# Patient Record
Sex: MALE | Race: WHITE | NOT HISPANIC OR LATINO | Employment: FULL TIME | URBAN - NONMETROPOLITAN AREA
[De-identification: names, ages, dates, MRNs, and addresses within clinical notes are randomized per-mention and may not be internally consistent; named-entity substitution may affect disease eponyms.]

---

## 2023-03-07 LAB
ALANINE AMINOTRANSFERASE (SGPT) (U/L) IN SER/PLAS: 20 U/L (ref 10–52)
ALBUMIN (G/DL) IN SER/PLAS: 4.2 G/DL (ref 3.4–5)
ALBUMIN (MG/L) IN URINE: <7 MG/L
ALBUMIN/CREATININE (UG/MG) IN URINE: NORMAL UG/MG CRT (ref 0–30)
ALKALINE PHOSPHATASE (U/L) IN SER/PLAS: 105 U/L (ref 33–120)
ANION GAP IN SER/PLAS: 11 MMOL/L (ref 10–20)
ASPARTATE AMINOTRANSFERASE (SGOT) (U/L) IN SER/PLAS: 14 U/L (ref 9–39)
BASOPHILS (10*3/UL) IN BLOOD BY AUTOMATED COUNT: 0.05 X10E9/L (ref 0–0.1)
BASOPHILS/100 LEUKOCYTES IN BLOOD BY AUTOMATED COUNT: 0.5 % (ref 0–2)
BILIRUBIN TOTAL (MG/DL) IN SER/PLAS: 0.6 MG/DL (ref 0–1.2)
CALCIUM (MG/DL) IN SER/PLAS: 9.3 MG/DL (ref 8.6–10.6)
CARBON DIOXIDE, TOTAL (MMOL/L) IN SER/PLAS: 29 MMOL/L (ref 21–32)
CHLORIDE (MMOL/L) IN SER/PLAS: 104 MMOL/L (ref 98–107)
CHOLESTEROL (MG/DL) IN SER/PLAS: 116 MG/DL (ref 0–199)
CHOLESTEROL IN HDL (MG/DL) IN SER/PLAS: 28.8 MG/DL
CHOLESTEROL/HDL RATIO: 4
COBALAMIN (VITAMIN B12) (PG/ML) IN SER/PLAS: 340 PG/ML (ref 211–911)
CREATININE (MG/DL) IN SER/PLAS: 0.75 MG/DL (ref 0.5–1.3)
CREATININE (MG/DL) IN URINE: 66 MG/DL (ref 20–370)
EOSINOPHILS (10*3/UL) IN BLOOD BY AUTOMATED COUNT: 0.17 X10E9/L (ref 0–0.7)
EOSINOPHILS/100 LEUKOCYTES IN BLOOD BY AUTOMATED COUNT: 1.7 % (ref 0–6)
ERYTHROCYTE DISTRIBUTION WIDTH (RATIO) BY AUTOMATED COUNT: 13 % (ref 11.5–14.5)
ERYTHROCYTE MEAN CORPUSCULAR HEMOGLOBIN CONCENTRATION (G/DL) BY AUTOMATED: 31.6 G/DL (ref 32–36)
ERYTHROCYTE MEAN CORPUSCULAR VOLUME (FL) BY AUTOMATED COUNT: 96 FL (ref 80–100)
ERYTHROCYTES (10*6/UL) IN BLOOD BY AUTOMATED COUNT: 5.64 X10E12/L (ref 4.5–5.9)
ESTIMATED AVERAGE GLUCOSE FOR HBA1C: 154 MG/DL
GFR MALE: >90 ML/MIN/1.73M2
GLUCOSE (MG/DL) IN SER/PLAS: 149 MG/DL (ref 74–99)
HEMATOCRIT (%) IN BLOOD BY AUTOMATED COUNT: 54.4 % (ref 41–52)
HEMOGLOBIN (G/DL) IN BLOOD: 17.2 G/DL (ref 13.5–17.5)
HEMOGLOBIN A1C/HEMOGLOBIN TOTAL IN BLOOD: 7 %
IMMATURE GRANULOCYTES/100 LEUKOCYTES IN BLOOD BY AUTOMATED COUNT: 0.7 % (ref 0–0.9)
LDL: 42 MG/DL (ref 0–99)
LEUKOCYTES (10*3/UL) IN BLOOD BY AUTOMATED COUNT: 9.7 X10E9/L (ref 4.4–11.3)
LYMPHOCYTES (10*3/UL) IN BLOOD BY AUTOMATED COUNT: 2.29 X10E9/L (ref 1.2–4.8)
LYMPHOCYTES/100 LEUKOCYTES IN BLOOD BY AUTOMATED COUNT: 23.5 % (ref 13–44)
MONOCYTES (10*3/UL) IN BLOOD BY AUTOMATED COUNT: 0.91 X10E9/L (ref 0.1–1)
MONOCYTES/100 LEUKOCYTES IN BLOOD BY AUTOMATED COUNT: 9.4 % (ref 2–10)
NEUTROPHILS (10*3/UL) IN BLOOD BY AUTOMATED COUNT: 6.24 X10E9/L (ref 1.2–7.7)
NEUTROPHILS/100 LEUKOCYTES IN BLOOD BY AUTOMATED COUNT: 64.2 % (ref 40–80)
NON HDL CHOLESTEROL: 87 MG/DL
NRBC (PER 100 WBCS) BY AUTOMATED COUNT: 0 /100 WBC (ref 0–0)
PLATELETS (10*3/UL) IN BLOOD AUTOMATED COUNT: 180 X10E9/L (ref 150–450)
POTASSIUM (MMOL/L) IN SER/PLAS: 4.4 MMOL/L (ref 3.5–5.3)
PROSTATE SPECIFIC ANTIGEN,SCREEN: 1.85 NG/ML (ref 0–4)
PROTEIN TOTAL: 6.9 G/DL (ref 6.4–8.2)
SODIUM (MMOL/L) IN SER/PLAS: 140 MMOL/L (ref 136–145)
THYROTROPIN (MIU/L) IN SER/PLAS BY DETECTION LIMIT <= 0.05 MIU/L: 1.26 MIU/L (ref 0.44–3.98)
TRIGLYCERIDE (MG/DL) IN SER/PLAS: 224 MG/DL (ref 0–149)
UREA NITROGEN (MG/DL) IN SER/PLAS: 12 MG/DL (ref 6–23)
VLDL: 45 MG/DL (ref 0–40)

## 2023-03-08 DIAGNOSIS — E11.9 TYPE 2 DIABETES MELLITUS WITHOUT COMPLICATION, WITHOUT LONG-TERM CURRENT USE OF INSULIN (MULTI): Primary | ICD-10-CM

## 2023-03-08 DIAGNOSIS — E53.8 VITAMIN B 12 DEFICIENCY: ICD-10-CM

## 2023-07-26 ENCOUNTER — TELEPHONE (OUTPATIENT)
Dept: PRIMARY CARE | Facility: CLINIC | Age: 58
End: 2023-07-26
Payer: COMMERCIAL

## 2023-07-26 NOTE — TELEPHONE ENCOUNTER
Would like to examine him, please ask him to come tomorrow at 330, he needs hormones drawn, and I will decide whether a mammogram or ultrasound are appropriate.

## 2023-07-26 NOTE — TELEPHONE ENCOUNTER
The patient was seen at [facility] today. He was advised to have a mammogram. He is calling to request the order.  I have sent the email to request the records.  Once obtained, will place on the providers desk for review.

## 2023-07-27 ENCOUNTER — LAB (OUTPATIENT)
Dept: LAB | Facility: LAB | Age: 58
End: 2023-07-27
Payer: COMMERCIAL

## 2023-07-27 ENCOUNTER — OFFICE VISIT (OUTPATIENT)
Dept: PRIMARY CARE | Facility: CLINIC | Age: 58
End: 2023-07-27
Payer: COMMERCIAL

## 2023-07-27 VITALS
RESPIRATION RATE: 16 BRPM | BODY MASS INDEX: 30.17 KG/M2 | DIASTOLIC BLOOD PRESSURE: 68 MMHG | WEIGHT: 210.3 LBS | TEMPERATURE: 98.1 F | SYSTOLIC BLOOD PRESSURE: 120 MMHG | OXYGEN SATURATION: 94 % | HEART RATE: 73 BPM

## 2023-07-27 DIAGNOSIS — N64.59 ABNORMAL BREAST FINDING: Primary | ICD-10-CM

## 2023-07-27 DIAGNOSIS — N62 GYNECOMASTIA, MALE: ICD-10-CM

## 2023-07-27 DIAGNOSIS — N64.59 ABNORMAL BREAST FINDING: ICD-10-CM

## 2023-07-27 PROBLEM — N63.20 BREAST MASS, LEFT: Status: ACTIVE | Noted: 2023-07-27

## 2023-07-27 PROBLEM — R07.89 CHEST WALL PAIN: Status: ACTIVE | Noted: 2023-07-27

## 2023-07-27 PROBLEM — D58.2 ELEVATED HEMOGLOBIN (CMS-HCC): Status: ACTIVE | Noted: 2023-07-27

## 2023-07-27 PROBLEM — H93.11 TINNITUS, RIGHT: Status: ACTIVE | Noted: 2023-07-27

## 2023-07-27 PROBLEM — I82.409 LEG DVT (DEEP VENOUS THROMBOEMBOLISM), ACUTE (MULTI): Status: ACTIVE | Noted: 2023-07-27

## 2023-07-27 PROBLEM — M25.552 LEFT HIP PAIN: Status: ACTIVE | Noted: 2023-07-27

## 2023-07-27 PROBLEM — B00.1 COLD SORE: Status: ACTIVE | Noted: 2023-07-27

## 2023-07-27 PROBLEM — M51.36 DEGENERATIVE DISC DISEASE, LUMBAR: Status: ACTIVE | Noted: 2023-07-27

## 2023-07-27 PROBLEM — M79.606 LEG PAIN: Status: ACTIVE | Noted: 2023-07-27

## 2023-07-27 PROBLEM — M17.11 RIGHT KNEE DJD: Status: ACTIVE | Noted: 2023-07-27

## 2023-07-27 PROBLEM — I49.9 ARRHYTHMIA: Status: ACTIVE | Noted: 2023-07-27

## 2023-07-27 PROBLEM — K21.9 GERD (GASTROESOPHAGEAL REFLUX DISEASE): Status: ACTIVE | Noted: 2023-07-27

## 2023-07-27 PROBLEM — I47.10 SVT (SUPRAVENTRICULAR TACHYCARDIA) (CMS-HCC): Status: ACTIVE | Noted: 2023-07-27

## 2023-07-27 PROBLEM — F17.200 TOBACCO DEPENDENCE: Status: ACTIVE | Noted: 2023-07-27

## 2023-07-27 PROBLEM — L03.119 CELLULITIS OF LOWER LEG: Status: ACTIVE | Noted: 2023-07-27

## 2023-07-27 PROBLEM — M76.892 TENDINITIS OF BOTH QUADRICEP TENDONS: Status: ACTIVE | Noted: 2023-07-27

## 2023-07-27 PROBLEM — M51.369 DEGENERATIVE DISC DISEASE, LUMBAR: Status: ACTIVE | Noted: 2023-07-27

## 2023-07-27 PROBLEM — H90.3 SENSORINEURAL HEARING LOSS OF BOTH EARS: Status: ACTIVE | Noted: 2023-07-27

## 2023-07-27 PROBLEM — S43.439A LABRAL TEAR OF SHOULDER: Status: ACTIVE | Noted: 2023-07-27

## 2023-07-27 PROBLEM — M19.019 AC (ACROMIOCLAVICULAR) JOINT ARTHRITIS: Status: ACTIVE | Noted: 2023-07-27

## 2023-07-27 PROBLEM — M76.892 TENDINITIS OF LEFT HIP: Status: ACTIVE | Noted: 2023-07-27

## 2023-07-27 PROBLEM — M25.522 LEFT ELBOW PAIN: Status: ACTIVE | Noted: 2023-07-27

## 2023-07-27 PROBLEM — I82.409 DVT, LOWER EXTREMITY, RECURRENT (MULTI): Status: ACTIVE | Noted: 2023-07-27

## 2023-07-27 PROBLEM — M25.561 RIGHT KNEE PAIN: Status: ACTIVE | Noted: 2023-07-27

## 2023-07-27 PROBLEM — N52.9 MALE ERECTILE DISORDER: Status: ACTIVE | Noted: 2023-07-27

## 2023-07-27 PROBLEM — R21 RASH: Status: ACTIVE | Noted: 2023-07-27

## 2023-07-27 PROBLEM — H61.21 IMPACTED CERUMEN OF RIGHT EAR: Status: ACTIVE | Noted: 2023-07-27

## 2023-07-27 PROBLEM — S73.192D ACETABULAR LABRUM TEAR, LEFT, SUBSEQUENT ENCOUNTER: Status: ACTIVE | Noted: 2023-07-27

## 2023-07-27 PROBLEM — Z86.718 HISTORY OF DVT (DEEP VEIN THROMBOSIS): Status: ACTIVE | Noted: 2023-07-27

## 2023-07-27 PROBLEM — K44.9 HIATAL HERNIA: Status: ACTIVE | Noted: 2023-07-27

## 2023-07-27 PROBLEM — G89.4 CHRONIC PAIN DISORDER: Status: ACTIVE | Noted: 2023-07-27

## 2023-07-27 PROBLEM — M25.519 SHOULDER PAIN: Status: ACTIVE | Noted: 2023-07-27

## 2023-07-27 PROBLEM — M79.642 PAIN OF LEFT HAND: Status: ACTIVE | Noted: 2023-07-27

## 2023-07-27 PROBLEM — M76.891 TENDINITIS OF BOTH QUADRICEP TENDONS: Status: ACTIVE | Noted: 2023-07-27

## 2023-07-27 PROBLEM — R79.89 LOW TESTOSTERONE: Status: ACTIVE | Noted: 2023-07-27

## 2023-07-27 PROBLEM — R07.81 RIB PAIN ON LEFT SIDE: Status: ACTIVE | Noted: 2023-07-27

## 2023-07-27 PROBLEM — E78.5 HYPERLIPIDEMIA: Status: ACTIVE | Noted: 2023-07-27

## 2023-07-27 PROBLEM — E11.9 TYPE 2 DIABETES MELLITUS (MULTI): Status: ACTIVE | Noted: 2023-07-27

## 2023-07-27 PROBLEM — F44.6 HEARING LOSS, FUNCTIONAL: Status: ACTIVE | Noted: 2023-07-27

## 2023-07-27 PROCEDURE — 99213 OFFICE O/P EST LOW 20 MIN: CPT | Performed by: FAMILY MEDICINE

## 2023-07-27 PROCEDURE — 3078F DIAST BP <80 MM HG: CPT | Performed by: FAMILY MEDICINE

## 2023-07-27 PROCEDURE — 3051F HG A1C>EQUAL 7.0%<8.0%: CPT | Performed by: FAMILY MEDICINE

## 2023-07-27 PROCEDURE — 3074F SYST BP LT 130 MM HG: CPT | Performed by: FAMILY MEDICINE

## 2023-07-27 RX ORDER — SILDENAFIL 100 MG/1
100 TABLET, FILM COATED ORAL AS NEEDED
COMMUNITY
Start: 2023-07-10 | End: 2023-08-15

## 2023-07-27 RX ORDER — MORPHINE SULFATE 30 MG/1
30 TABLET, FILM COATED, EXTENDED RELEASE ORAL 2 TIMES DAILY
COMMUNITY
End: 2024-03-13 | Stop reason: ALTCHOICE

## 2023-07-27 RX ORDER — MAGNESIUM 200 MG
TABLET ORAL
COMMUNITY
Start: 2022-02-14

## 2023-07-27 RX ORDER — RIVAROXABAN 20 MG/1
1 TABLET, FILM COATED ORAL DAILY
COMMUNITY
Start: 2022-08-17 | End: 2023-10-30

## 2023-07-27 RX ORDER — METFORMIN HYDROCHLORIDE 500 MG/1
TABLET, EXTENDED RELEASE ORAL
COMMUNITY
End: 2024-02-15

## 2023-07-27 RX ORDER — METOPROLOL TARTRATE 50 MG/1
1 TABLET ORAL EVERY 12 HOURS
COMMUNITY
Start: 2015-08-25 | End: 2024-02-15

## 2023-07-27 RX ORDER — BLOOD-GLUCOSE METER
EACH MISCELLANEOUS
COMMUNITY
Start: 2016-05-12 | End: 2024-01-21 | Stop reason: WASHOUT

## 2023-07-27 RX ORDER — GLIMEPIRIDE 4 MG/1
1 TABLET ORAL DAILY
COMMUNITY
Start: 2018-05-21 | End: 2024-02-15

## 2023-07-27 RX ORDER — ATORVASTATIN CALCIUM 40 MG/1
1 TABLET, FILM COATED ORAL DAILY
COMMUNITY
End: 2024-02-15

## 2023-07-27 RX ORDER — DAPAGLIFLOZIN 10 MG/1
1 TABLET, FILM COATED ORAL DAILY
COMMUNITY
Start: 2021-03-03 | End: 2024-01-29

## 2023-07-27 RX ORDER — TRAZODONE HYDROCHLORIDE 50 MG/1
50 TABLET ORAL NIGHTLY
COMMUNITY

## 2023-07-27 ASSESSMENT — PATIENT HEALTH QUESTIONNAIRE - PHQ9
SUM OF ALL RESPONSES TO PHQ9 QUESTIONS 1 AND 2: 0
2. FEELING DOWN, DEPRESSED OR HOPELESS: NOT AT ALL
1. LITTLE INTEREST OR PLEASURE IN DOING THINGS: NOT AT ALL

## 2023-07-27 ASSESSMENT — PAIN SCALES - GENERAL: PAINLEVEL: 0-NO PAIN

## 2023-07-28 LAB
DEHYDROEPIANDROSTERONE SULFATE (DHEA-S) (UG/DL) IN SER/: 140 UG/DL (ref 70–310)
ESTRADIOL (PG/ML) IN SER/PLAS: 37 PG/ML
FOLLITROPIN (IU/L) IN SER/PLAS: 6 IU/L
GROWTH HORMONE: NORMAL
HCG TUMOR MARKER: <3 IU/L
LUTEINIZING HORMONE (IU/ML) IN SER/PLAS: 4.1 IU/L
PROGESTERONE (NG/ML) IN SER/PLAS: <0.3 NG/ML
PROLACTIN (UG/L) IN SER/PLAS: 5.3 UG/L (ref 2–18)
TESTOSTERONE (NG/DL) IN SER/PLAS: 270 NG/DL (ref 240–1000)
THYROTROPIN (MIU/L) IN SER/PLAS BY DETECTION LIMIT <= 0.05 MIU/L: 1.24 MIU/L (ref 0.44–3.98)

## 2023-08-01 ENCOUNTER — APPOINTMENT (OUTPATIENT)
Dept: PRIMARY CARE | Facility: CLINIC | Age: 58
End: 2023-08-01
Payer: COMMERCIAL

## 2023-08-01 LAB — ANDROSTENEDIONE (NG/DL) IN SER/PLAS: 0.84 NG/ML (ref 0.23–0.89)

## 2023-08-03 ENCOUNTER — TELEPHONE (OUTPATIENT)
Dept: PRIMARY CARE | Facility: CLINIC | Age: 58
End: 2023-08-03
Payer: COMMERCIAL

## 2023-08-03 NOTE — PROGRESS NOTES
He did not do the ultrasound yet, he said he guarantees these are not his results his US is on 8/15

## 2023-08-03 NOTE — TELEPHONE ENCOUNTER
Addendum, I figured out the breast ultrasound confusion, will call him after his results RN in another couple of weeks.

## 2023-08-03 NOTE — TELEPHONE ENCOUNTER
----- Message from Sharda Sparks CMA sent at 8/3/2023  8:10 AM EDT -----        ----- Message -----  From: Bradley Mccormick MD  Sent: 8/3/2023   6:38 AM EDT  To: [place] [id number] Clinical Support Staff    Not sure if Nathaniel was called after his labs and ultrasound were completed, all hormones and labs were normal.  Ultrasound just showed benign-appearing breast tissue behind both nipples.  He has follow-up with Dr. Mix in about 3 weeks, can review further at that time.

## 2023-08-03 NOTE — TELEPHONE ENCOUNTER
----- Message from Sharda Sparks CMA sent at 8/3/2023  8:10 AM EDT -----        ----- Message -----  From: Bradley Mccormick MD  Sent: 8/3/2023   6:38 AM EDT  To: [place] [id number] Clinical Support Staff    Not sure if Nathaniel was called after his labs and ultrasound were completed, all hormones and labs were normal.  Ultrasound just showed benign-appearing breast tissue behind both nipples.  He has follow-up with Dr. Mix in about 3 weeks, can review further at that time.

## 2023-08-03 NOTE — Clinical Note
Not sure if Nathaniel was called after his labs and ultrasound were completed, all hormones and labs were normal.  Ultrasound just showed benign-appearing breast tissue behind both nipples.  He has follow-up with Dr. Mix in about 3 weeks, can review further at that time.

## 2023-08-03 NOTE — PROGRESS NOTES
Subjective   Patient ID: Madhu Shah is a 58 y.o. male who presents for Follow-up (Found lump in left breast and wanted him to be seen by Primary ).    HPI   Nathaniel was seen today, for a left breast lump, was seen in the urgent care a day or 2 ago, was told to get a mammogram.  I recommended follow-up here, for an exam, as well as laboratory studies.  Denies significant pain, skin changes, no nipple discharge.  Right side is fine.  Energy is good, he is taking medications as prescribed, has no other concerns.  Review of Systems  The full, 10+ multi-organ review of systems, is within normal limits with the exception of what is noted above in HPI.  Objective   /68 (BP Location: Right arm, Patient Position: Sitting, BP Cuff Size: Adult)   Pulse 73   Temp 36.7 °C (98.1 °F) (Temporal)   Resp 16   Wt 95.4 kg (210 lb 4.8 oz)   SpO2 94%   BMI 30.17 kg/m²     Physical Exam  Cardiac exam reveals a regular rate rhythm, lungs are clear, no lower extremity edema present.  Left breast exam reveals, directly behind the areola, extending slightly laterally, and approximately 3 cm spongy mobile minimally tender soft tissue lump.  No skin changes.  No axillary adenopathy or supraclavicular adenopathy noted  Assessment/Plan     Gynecomastia, left-sided, we will order extensive hormone and routine labs as noted, as well as a limited left breast ultrasound.  He takes no medications that would typically cause gynecomastia, i.e. such as spironolactone, SSRIs, etc.  We will call when the results are available, come up with a further plan thereafter.    **Portions of this medical record have been created using voice recognition software and may have minor errors which are inherent in voice recognition systems. It has not been fully edited for typographical or grammatical errors**      Total time spent with patient was 25 minutes, over half of it spent counseling and/or coordinating care  **Portions of this medical record have  been created using voice recognition software and may have minor errors which are inherent in voice recognition systems. It has not been fully edited for typographical or grammatical errors**

## 2023-08-14 DIAGNOSIS — N52.2 DRUG-INDUCED ERECTILE DYSFUNCTION: Primary | ICD-10-CM

## 2023-08-15 RX ORDER — SILDENAFIL 100 MG/1
100 TABLET, FILM COATED ORAL AS NEEDED
Qty: 12 TABLET | Refills: 11 | Status: SHIPPED | OUTPATIENT
Start: 2023-08-15

## 2023-08-18 NOTE — PROGRESS NOTES
Subjective   Patient ID: Madhu Shah is a 58 y.o. male who presents for 6 mo fuv (No complaints).      HPI  The patient is here for follow-up.  He has type 2 diabetes.  Takes metformin and glimepiride and Farxiga.  Denies side effects.  Trying to follow healthy diabetic diet.    Has elevated cholesterol.  Takes atorvastatin to help lower cholesterol.  Denies side effects no chest pain chest pressure chest tightness with activity.  Tries to be as active as possible.    Still smoking.  At this time not interested in medication to help reduce use of tobacco use.    Continues on metoprolol and Xarelto with no side effects.  Continues on trazodone.      Review of Systems   All other systems reviewed and are negative.        Patient Care Team:  Cole Mix MD as PCP - General  Karen Quintanilla MD as PCP - [facility] PCP       Objective   /66 (BP Location: Right arm, Patient Position: Sitting, BP Cuff Size: Adult)   Pulse 67   Temp 36.6 °C (97.8 °F) (Temporal)   Wt 96.6 kg (213 lb)   SpO2 94%   BMI 30.56 kg/m²     Physical Exam  Vitals reviewed.   Constitutional:       Appearance: Normal appearance. He is normal weight.   HENT:      Head: Normocephalic and atraumatic.      Right Ear: Tympanic membrane, ear canal and external ear normal.      Left Ear: Tympanic membrane, ear canal and external ear normal.      Nose: Nose normal.      Mouth/Throat:      Mouth: Mucous membranes are moist.      Pharynx: Oropharynx is clear.   Eyes:      Extraocular Movements: Extraocular movements intact.      Conjunctiva/sclera: Conjunctivae normal.      Pupils: Pupils are equal, round, and reactive to light.   Neck:      Vascular: No carotid bruit.   Cardiovascular:      Rate and Rhythm: Normal rate and regular rhythm.      Pulses: Normal pulses.      Heart sounds: Normal heart sounds. No murmur heard.  Pulmonary:      Effort: Pulmonary effort is normal. No respiratory distress.      Breath sounds: Normal breath sounds. No  wheezing, rhonchi or rales.   Abdominal:      General: Abdomen is flat. Bowel sounds are normal.      Palpations: Abdomen is soft. There is no mass.      Tenderness: There is no abdominal tenderness. There is no guarding.   Musculoskeletal:         General: No swelling or deformity. Normal range of motion.      Cervical back: Normal range of motion and neck supple.      Right lower leg: No edema.      Left lower leg: No edema.   Lymphadenopathy:      Cervical: No cervical adenopathy.   Skin:     General: Skin is warm and dry.      Capillary Refill: Capillary refill takes less than 2 seconds.   Neurological:      General: No focal deficit present.      Mental Status: He is alert and oriented to person, place, and time.   Psychiatric:         Mood and Affect: Mood normal.         Behavior: Behavior normal.         Thought Content: Thought content normal.         Judgment: Judgment normal.         Labs reviewed from :              CMP, CBC, Lipid, HgA1C 7.5 % WNL.       Assessment/Plan   Problem List Items Addressed This Visit       Arrhythmia    Elevated hemoglobin (CMS/HCC)    GERD (gastroesophageal reflux disease)    History of DVT (deep vein thrombosis)    Hyperlipidemia    SVT (supraventricular tachycardia) (CMS/HCC)    Type 2 diabetes mellitus (CMS/HCC) - Primary    Relevant Orders    Hemoglobin A1C    Albumin , Urine Random    Tobacco dependence    Relevant Orders    CT lung screening low dose     Other Visit Diagnoses       Prostate cancer screening        Relevant Orders    Prostate Specific Antigen, Screen    Wellness examination        Relevant Orders    Comprehensive Metabolic Panel    Lipid Panel    CBC    Follow Up In [facility] - PCP - Health Maintenance    Need for shingles vaccine        Relevant Orders    Zoster vaccine, recombinant, adult (SHINGRIX) (Completed)          #1 type 2 diabetes    Recent labs indicate mild elevation in hemoglobin A1c.    Goal for hemoglobin A1c less than 7.0 to  reduce complications that can occur from diabetes.    Please stay on current doses of metformin glimepiride and Farxiga.  In addition eat a heart healthy diabetic diet.  A good goal is 5-7 servings of fresh fruit and fresh vegetable every day in addition to lean proteins.    Please see your eye doctor at least once a year being checked for diabetic retinopathy.    With your next labs in 6 months we will repeat the hemoglobin A1c we will also do the urine testing at that time looking for protein in the urine    2.  Continue on Xarelto to prevent blood clots    3.  Continue on atorvastatin to help lower cholesterol to reduce risk for heart attack and stroke.  If you have chest pain chest pressure chest tightness with activity please call    4.  Hypertension.  Blood pressures in good control    5.  Continue on trazodone to help with sleep    6.  Recent mammogram is normal indicating no abnormal breast tissue    7.  White I have ordered a CT scan for lung cancer screening if you choose to stop smoking and you would like help with medication such as Chantix please call    I will see you back in 6 months    You will receive the shingles vaccine today      Follow up in: 6 months or sooner if needed  labs prior.    Scribe Attestation  By signing my name below, ITete Scribe   attest that this documentation has been prepared under the direction and in the presence of Cole Mix MD.

## 2023-08-19 ENCOUNTER — LAB (OUTPATIENT)
Dept: LAB | Facility: LAB | Age: 58
End: 2023-08-19
Payer: COMMERCIAL

## 2023-08-19 DIAGNOSIS — E11.9 TYPE 2 DIABETES MELLITUS WITHOUT COMPLICATION, WITHOUT LONG-TERM CURRENT USE OF INSULIN (MULTI): ICD-10-CM

## 2023-08-19 DIAGNOSIS — E53.8 VITAMIN B 12 DEFICIENCY: ICD-10-CM

## 2023-08-19 LAB
ALANINE AMINOTRANSFERASE (SGPT) (U/L) IN SER/PLAS: 18 U/L (ref 10–52)
ALBUMIN (G/DL) IN SER/PLAS: 4.1 G/DL (ref 3.4–5)
ALKALINE PHOSPHATASE (U/L) IN SER/PLAS: 100 U/L (ref 33–120)
ANION GAP IN SER/PLAS: 13 MMOL/L (ref 10–20)
ASPARTATE AMINOTRANSFERASE (SGOT) (U/L) IN SER/PLAS: 14 U/L (ref 9–39)
BASOPHILS (10*3/UL) IN BLOOD BY AUTOMATED COUNT: 0.05 X10E9/L (ref 0–0.1)
BASOPHILS/100 LEUKOCYTES IN BLOOD BY AUTOMATED COUNT: 0.5 % (ref 0–2)
BILIRUBIN TOTAL (MG/DL) IN SER/PLAS: 0.6 MG/DL (ref 0–1.2)
CALCIUM (MG/DL) IN SER/PLAS: 9.3 MG/DL (ref 8.6–10.6)
CARBON DIOXIDE, TOTAL (MMOL/L) IN SER/PLAS: 27 MMOL/L (ref 21–32)
CHLORIDE (MMOL/L) IN SER/PLAS: 106 MMOL/L (ref 98–107)
CHOLESTEROL (MG/DL) IN SER/PLAS: 118 MG/DL (ref 0–199)
CHOLESTEROL IN HDL (MG/DL) IN SER/PLAS: 28.4 MG/DL
CHOLESTEROL/HDL RATIO: 4.2
COBALAMIN (VITAMIN B12) (PG/ML) IN SER/PLAS: 331 PG/ML (ref 211–911)
CREATININE (MG/DL) IN SER/PLAS: 0.95 MG/DL (ref 0.5–1.3)
EOSINOPHILS (10*3/UL) IN BLOOD BY AUTOMATED COUNT: 0.21 X10E9/L (ref 0–0.7)
EOSINOPHILS/100 LEUKOCYTES IN BLOOD BY AUTOMATED COUNT: 2.3 % (ref 0–6)
ERYTHROCYTE DISTRIBUTION WIDTH (RATIO) BY AUTOMATED COUNT: 12.9 % (ref 11.5–14.5)
ERYTHROCYTE MEAN CORPUSCULAR HEMOGLOBIN CONCENTRATION (G/DL) BY AUTOMATED: 32.5 G/DL (ref 32–36)
ERYTHROCYTE MEAN CORPUSCULAR VOLUME (FL) BY AUTOMATED COUNT: 95 FL (ref 80–100)
ERYTHROCYTES (10*6/UL) IN BLOOD BY AUTOMATED COUNT: 5.89 X10E12/L (ref 4.5–5.9)
ESTIMATED AVERAGE GLUCOSE FOR HBA1C: 169 MG/DL
GFR MALE: >90 ML/MIN/1.73M2
GLUCOSE (MG/DL) IN SER/PLAS: 134 MG/DL (ref 74–99)
HEMATOCRIT (%) IN BLOOD BY AUTOMATED COUNT: 56 % (ref 41–52)
HEMOGLOBIN (G/DL) IN BLOOD: 18.2 G/DL (ref 13.5–17.5)
HEMOGLOBIN A1C/HEMOGLOBIN TOTAL IN BLOOD: 7.5 %
IMMATURE GRANULOCYTES/100 LEUKOCYTES IN BLOOD BY AUTOMATED COUNT: 0.5 % (ref 0–0.9)
LDL: 46 MG/DL (ref 0–99)
LEUKOCYTES (10*3/UL) IN BLOOD BY AUTOMATED COUNT: 9.2 X10E9/L (ref 4.4–11.3)
LYMPHOCYTES (10*3/UL) IN BLOOD BY AUTOMATED COUNT: 2.86 X10E9/L (ref 1.2–4.8)
LYMPHOCYTES/100 LEUKOCYTES IN BLOOD BY AUTOMATED COUNT: 31.3 % (ref 13–44)
MONOCYTES (10*3/UL) IN BLOOD BY AUTOMATED COUNT: 0.78 X10E9/L (ref 0.1–1)
MONOCYTES/100 LEUKOCYTES IN BLOOD BY AUTOMATED COUNT: 8.5 % (ref 2–10)
NEUTROPHILS (10*3/UL) IN BLOOD BY AUTOMATED COUNT: 5.2 X10E9/L (ref 1.2–7.7)
NEUTROPHILS/100 LEUKOCYTES IN BLOOD BY AUTOMATED COUNT: 56.9 % (ref 40–80)
NON HDL CHOLESTEROL: 90 MG/DL
NRBC (PER 100 WBCS) BY AUTOMATED COUNT: 0 /100 WBC (ref 0–0)
PLATELETS (10*3/UL) IN BLOOD AUTOMATED COUNT: 169 X10E9/L (ref 150–450)
POTASSIUM (MMOL/L) IN SER/PLAS: 4.5 MMOL/L (ref 3.5–5.3)
PROTEIN TOTAL: 6.9 G/DL (ref 6.4–8.2)
SODIUM (MMOL/L) IN SER/PLAS: 141 MMOL/L (ref 136–145)
TRIGLYCERIDE (MG/DL) IN SER/PLAS: 217 MG/DL (ref 0–149)
UREA NITROGEN (MG/DL) IN SER/PLAS: 9 MG/DL (ref 6–23)
VLDL: 43 MG/DL (ref 0–40)

## 2023-08-19 PROCEDURE — 36415 COLL VENOUS BLD VENIPUNCTURE: CPT

## 2023-08-19 PROCEDURE — 80061 LIPID PANEL: CPT

## 2023-08-19 PROCEDURE — 82652 VIT D 1 25-DIHYDROXY: CPT

## 2023-08-19 PROCEDURE — 85025 COMPLETE CBC W/AUTO DIFF WBC: CPT

## 2023-08-19 PROCEDURE — 82607 VITAMIN B-12: CPT

## 2023-08-19 PROCEDURE — 83036 HEMOGLOBIN GLYCOSYLATED A1C: CPT

## 2023-08-19 PROCEDURE — 80053 COMPREHEN METABOLIC PANEL: CPT

## 2023-08-21 ENCOUNTER — OFFICE VISIT (OUTPATIENT)
Dept: PRIMARY CARE | Facility: CLINIC | Age: 58
End: 2023-08-21
Payer: COMMERCIAL

## 2023-08-21 VITALS
BODY MASS INDEX: 30.56 KG/M2 | DIASTOLIC BLOOD PRESSURE: 66 MMHG | HEART RATE: 67 BPM | WEIGHT: 213 LBS | TEMPERATURE: 97.8 F | SYSTOLIC BLOOD PRESSURE: 115 MMHG | OXYGEN SATURATION: 94 %

## 2023-08-21 DIAGNOSIS — Z00.00 WELLNESS EXAMINATION: ICD-10-CM

## 2023-08-21 DIAGNOSIS — F17.200 TOBACCO DEPENDENCE: ICD-10-CM

## 2023-08-21 DIAGNOSIS — Z86.718 HISTORY OF DVT (DEEP VEIN THROMBOSIS): ICD-10-CM

## 2023-08-21 DIAGNOSIS — Z79.4 TYPE 2 DIABETES MELLITUS WITHOUT COMPLICATION, WITH LONG-TERM CURRENT USE OF INSULIN (MULTI): Primary | ICD-10-CM

## 2023-08-21 DIAGNOSIS — D58.2 ELEVATED HEMOGLOBIN (CMS-HCC): ICD-10-CM

## 2023-08-21 DIAGNOSIS — Z12.5 PROSTATE CANCER SCREENING: ICD-10-CM

## 2023-08-21 DIAGNOSIS — E78.2 MIXED HYPERLIPIDEMIA: ICD-10-CM

## 2023-08-21 DIAGNOSIS — I47.10 SVT (SUPRAVENTRICULAR TACHYCARDIA) (CMS-HCC): ICD-10-CM

## 2023-08-21 DIAGNOSIS — Z23 NEED FOR SHINGLES VACCINE: ICD-10-CM

## 2023-08-21 DIAGNOSIS — E11.9 TYPE 2 DIABETES MELLITUS WITHOUT COMPLICATION, WITH LONG-TERM CURRENT USE OF INSULIN (MULTI): Primary | ICD-10-CM

## 2023-08-21 DIAGNOSIS — I49.9 CARDIAC ARRHYTHMIA, UNSPECIFIED CARDIAC ARRHYTHMIA TYPE: ICD-10-CM

## 2023-08-21 DIAGNOSIS — K21.9 GASTROESOPHAGEAL REFLUX DISEASE WITHOUT ESOPHAGITIS: ICD-10-CM

## 2023-08-21 PROCEDURE — 3078F DIAST BP <80 MM HG: CPT | Performed by: FAMILY MEDICINE

## 2023-08-21 PROCEDURE — 3074F SYST BP LT 130 MM HG: CPT | Performed by: FAMILY MEDICINE

## 2023-08-21 PROCEDURE — 90750 HZV VACC RECOMBINANT IM: CPT | Performed by: FAMILY MEDICINE

## 2023-08-21 PROCEDURE — 99214 OFFICE O/P EST MOD 30 MIN: CPT | Performed by: FAMILY MEDICINE

## 2023-08-21 PROCEDURE — 90471 IMMUNIZATION ADMIN: CPT | Performed by: FAMILY MEDICINE

## 2023-08-21 PROCEDURE — 3051F HG A1C>EQUAL 7.0%<8.0%: CPT | Performed by: FAMILY MEDICINE

## 2023-08-23 LAB — VITAMIN D 1,25-DIHYDROXY: 41 PG/ML (ref 19.9–79.3)

## 2023-10-30 DIAGNOSIS — I82.409 RECURRENT DEEP VEIN THROMBOSIS (DVT) OF LOWER EXTREMITY, UNSPECIFIED LATERALITY (MULTI): Primary | ICD-10-CM

## 2023-10-30 RX ORDER — RIVAROXABAN 20 MG/1
20 TABLET, FILM COATED ORAL DAILY
Qty: 90 TABLET | Refills: 3 | Status: SHIPPED | OUTPATIENT
Start: 2023-10-30

## 2023-11-09 VITALS
DIASTOLIC BLOOD PRESSURE: 53 MMHG | DIASTOLIC BLOOD PRESSURE: 65 MMHG | RESPIRATION RATE: 16 BRPM | SYSTOLIC BLOOD PRESSURE: 101 MMHG | HEART RATE: 72 BPM | DIASTOLIC BLOOD PRESSURE: 49 MMHG | HEART RATE: 73 BPM | DIASTOLIC BLOOD PRESSURE: 65 MMHG | RESPIRATION RATE: 14 BRPM | OXYGEN SATURATION: 95 % | SYSTOLIC BLOOD PRESSURE: 127 MMHG | RESPIRATION RATE: 18 BRPM | RESPIRATION RATE: 13 BRPM | SYSTOLIC BLOOD PRESSURE: 110 MMHG | WEIGHT: 205 LBS | HEART RATE: 70 BPM | SYSTOLIC BLOOD PRESSURE: 101 MMHG | DIASTOLIC BLOOD PRESSURE: 69 MMHG | HEART RATE: 76 BPM | OXYGEN SATURATION: 95 % | HEART RATE: 61 BPM | HEART RATE: 69 BPM | SYSTOLIC BLOOD PRESSURE: 101 MMHG | DIASTOLIC BLOOD PRESSURE: 65 MMHG | RESPIRATION RATE: 13 BRPM | DIASTOLIC BLOOD PRESSURE: 51 MMHG | SYSTOLIC BLOOD PRESSURE: 116 MMHG | RESPIRATION RATE: 18 BRPM | OXYGEN SATURATION: 94 % | HEART RATE: 76 BPM | HEART RATE: 68 BPM | OXYGEN SATURATION: 92 % | OXYGEN SATURATION: 95 % | DIASTOLIC BLOOD PRESSURE: 57 MMHG | RESPIRATION RATE: 18 BRPM | OXYGEN SATURATION: 93 % | HEART RATE: 68 BPM | RESPIRATION RATE: 15 BRPM | DIASTOLIC BLOOD PRESSURE: 57 MMHG | RESPIRATION RATE: 16 BRPM | DIASTOLIC BLOOD PRESSURE: 55 MMHG | HEART RATE: 70 BPM | HEART RATE: 69 BPM | HEART RATE: 73 BPM | OXYGEN SATURATION: 93 % | OXYGEN SATURATION: 91 % | HEART RATE: 61 BPM | SYSTOLIC BLOOD PRESSURE: 127 MMHG | HEART RATE: 76 BPM | OXYGEN SATURATION: 91 % | DIASTOLIC BLOOD PRESSURE: 55 MMHG | DIASTOLIC BLOOD PRESSURE: 53 MMHG | DIASTOLIC BLOOD PRESSURE: 54 MMHG | SYSTOLIC BLOOD PRESSURE: 133 MMHG | SYSTOLIC BLOOD PRESSURE: 96 MMHG | HEART RATE: 61 BPM | HEART RATE: 73 BPM | HEIGHT: 70 IN | SYSTOLIC BLOOD PRESSURE: 116 MMHG | SYSTOLIC BLOOD PRESSURE: 96 MMHG | SYSTOLIC BLOOD PRESSURE: 116 MMHG | SYSTOLIC BLOOD PRESSURE: 127 MMHG | HEART RATE: 70 BPM | OXYGEN SATURATION: 92 % | RESPIRATION RATE: 15 BRPM | HEART RATE: 68 BPM | DIASTOLIC BLOOD PRESSURE: 53 MMHG | OXYGEN SATURATION: 90 % | TEMPERATURE: 98 F | HEIGHT: 70 IN | DIASTOLIC BLOOD PRESSURE: 49 MMHG | RESPIRATION RATE: 16 BRPM | DIASTOLIC BLOOD PRESSURE: 55 MMHG | TEMPERATURE: 98 F | DIASTOLIC BLOOD PRESSURE: 68 MMHG | DIASTOLIC BLOOD PRESSURE: 69 MMHG | HEART RATE: 72 BPM | SYSTOLIC BLOOD PRESSURE: 110 MMHG | HEART RATE: 72 BPM | DIASTOLIC BLOOD PRESSURE: 69 MMHG | OXYGEN SATURATION: 92 % | OXYGEN SATURATION: 90 % | OXYGEN SATURATION: 94 % | DIASTOLIC BLOOD PRESSURE: 51 MMHG | DIASTOLIC BLOOD PRESSURE: 49 MMHG | DIASTOLIC BLOOD PRESSURE: 54 MMHG | RESPIRATION RATE: 15 BRPM | SYSTOLIC BLOOD PRESSURE: 110 MMHG | DIASTOLIC BLOOD PRESSURE: 51 MMHG | WEIGHT: 205 LBS | TEMPERATURE: 98 F | OXYGEN SATURATION: 90 % | RESPIRATION RATE: 14 BRPM | SYSTOLIC BLOOD PRESSURE: 133 MMHG | SYSTOLIC BLOOD PRESSURE: 133 MMHG | OXYGEN SATURATION: 93 % | HEIGHT: 70 IN | HEART RATE: 69 BPM | DIASTOLIC BLOOD PRESSURE: 68 MMHG | OXYGEN SATURATION: 91 % | RESPIRATION RATE: 14 BRPM | DIASTOLIC BLOOD PRESSURE: 54 MMHG | WEIGHT: 205 LBS | DIASTOLIC BLOOD PRESSURE: 68 MMHG | DIASTOLIC BLOOD PRESSURE: 57 MMHG | OXYGEN SATURATION: 94 % | SYSTOLIC BLOOD PRESSURE: 96 MMHG | RESPIRATION RATE: 13 BRPM

## 2023-11-16 ENCOUNTER — AMBULATORY SURGICAL CENTER (OUTPATIENT)
Dept: URBAN - METROPOLITAN AREA SURGERY 12 | Facility: SURGERY | Age: 58
End: 2023-11-16

## 2023-11-16 ENCOUNTER — OFFICE (OUTPATIENT)
Dept: URBAN - METROPOLITAN AREA PATHOLOGY 2 | Facility: PATHOLOGY | Age: 58
End: 2023-11-16

## 2023-11-16 DIAGNOSIS — K64.8 OTHER HEMORRHOIDS: ICD-10-CM

## 2023-11-16 DIAGNOSIS — K63.5 POLYP OF COLON: ICD-10-CM

## 2023-11-16 DIAGNOSIS — K57.30 DIVERTICULOSIS OF LARGE INTESTINE WITHOUT PERFORATION OR ABS: ICD-10-CM

## 2023-11-16 DIAGNOSIS — Z86.010 PERSONAL HISTORY OF COLONIC POLYPS: ICD-10-CM

## 2023-11-16 DIAGNOSIS — Z09 ENCOUNTER FOR FOLLOW-UP EXAMINATION AFTER COMPLETED TREATMEN: ICD-10-CM

## 2023-11-16 PROBLEM — D12.5 BENIGN NEOPLASM OF SIGMOID COLON: Status: ACTIVE | Noted: 2023-11-16

## 2023-11-16 PROCEDURE — 45380 COLONOSCOPY AND BIOPSY: CPT | Performed by: INTERNAL MEDICINE

## 2023-11-16 PROCEDURE — 88305 TISSUE EXAM BY PATHOLOGIST: CPT | Performed by: PATHOLOGY

## 2023-11-28 ENCOUNTER — ANCILLARY PROCEDURE (OUTPATIENT)
Dept: RADIOLOGY | Facility: CLINIC | Age: 58
End: 2023-11-28
Payer: COMMERCIAL

## 2023-11-28 DIAGNOSIS — F17.200 NICOTINE DEPENDENCE, UNSPECIFIED, UNCOMPLICATED: ICD-10-CM

## 2023-11-28 PROCEDURE — 71271 CT THORAX LUNG CANCER SCR C-: CPT | Performed by: RADIOLOGY

## 2023-11-28 PROCEDURE — 71271 CT THORAX LUNG CANCER SCR C-: CPT

## 2024-01-19 ENCOUNTER — HOSPITAL ENCOUNTER (OUTPATIENT)
Dept: RADIOLOGY | Facility: CLINIC | Age: 59
Discharge: HOME | End: 2024-01-19
Payer: COMMERCIAL

## 2024-01-19 ENCOUNTER — OFFICE VISIT (OUTPATIENT)
Dept: PRIMARY CARE | Facility: CLINIC | Age: 59
End: 2024-01-19
Payer: COMMERCIAL

## 2024-01-19 ENCOUNTER — TELEPHONE (OUTPATIENT)
Dept: PRIMARY CARE | Facility: CLINIC | Age: 59
End: 2024-01-19

## 2024-01-19 VITALS
WEIGHT: 202.2 LBS | SYSTOLIC BLOOD PRESSURE: 123 MMHG | HEART RATE: 72 BPM | TEMPERATURE: 97.6 F | DIASTOLIC BLOOD PRESSURE: 73 MMHG | BODY MASS INDEX: 29.01 KG/M2 | OXYGEN SATURATION: 97 %

## 2024-01-19 DIAGNOSIS — J15.9 COMMUNITY ACQUIRED BACTERIAL PNEUMONIA: ICD-10-CM

## 2024-01-19 DIAGNOSIS — J32.9 SINUSITIS, UNSPECIFIED CHRONICITY, UNSPECIFIED LOCATION: ICD-10-CM

## 2024-01-19 DIAGNOSIS — J15.9 COMMUNITY ACQUIRED BACTERIAL PNEUMONIA: Primary | ICD-10-CM

## 2024-01-19 DIAGNOSIS — J06.9 UPPER RESPIRATORY TRACT INFECTION, UNSPECIFIED TYPE: ICD-10-CM

## 2024-01-19 PROCEDURE — 96372 THER/PROPH/DIAG INJ SC/IM: CPT | Performed by: FAMILY MEDICINE

## 2024-01-19 PROCEDURE — 3074F SYST BP LT 130 MM HG: CPT | Performed by: FAMILY MEDICINE

## 2024-01-19 PROCEDURE — 99214 OFFICE O/P EST MOD 30 MIN: CPT | Performed by: FAMILY MEDICINE

## 2024-01-19 PROCEDURE — 3078F DIAST BP <80 MM HG: CPT | Performed by: FAMILY MEDICINE

## 2024-01-19 PROCEDURE — 71046 X-RAY EXAM CHEST 2 VIEWS: CPT | Performed by: RADIOLOGY

## 2024-01-19 PROCEDURE — 71046 X-RAY EXAM CHEST 2 VIEWS: CPT

## 2024-01-19 RX ORDER — CEFTRIAXONE 1 G/1
1 INJECTION, POWDER, FOR SOLUTION INTRAMUSCULAR; INTRAVENOUS ONCE
Status: COMPLETED | OUTPATIENT
Start: 2024-01-19 | End: 2024-01-19

## 2024-01-19 RX ORDER — ALBUTEROL SULFATE 90 UG/1
2 AEROSOL, METERED RESPIRATORY (INHALATION) EVERY 4 HOURS PRN
Qty: 8 G | Refills: 5 | Status: SHIPPED | OUTPATIENT
Start: 2024-01-19 | End: 2025-01-18

## 2024-01-19 RX ORDER — BENZONATATE 200 MG/1
200 CAPSULE ORAL 3 TIMES DAILY PRN
Qty: 42 CAPSULE | Refills: 0 | Status: SHIPPED | OUTPATIENT
Start: 2024-01-19 | End: 2024-02-18

## 2024-01-19 RX ORDER — LEVOFLOXACIN 750 MG/1
750 TABLET ORAL DAILY
Qty: 7 TABLET | Refills: 0 | Status: SHIPPED | OUTPATIENT
Start: 2024-01-19 | End: 2024-01-26

## 2024-01-19 RX ADMIN — CEFTRIAXONE 1 G: 1 INJECTION, POWDER, FOR SOLUTION INTRAMUSCULAR; INTRAVENOUS at 09:28

## 2024-01-19 ASSESSMENT — ENCOUNTER SYMPTOMS
EYES NEGATIVE: 1
PSYCHIATRIC NEGATIVE: 1
CARDIOVASCULAR NEGATIVE: 1
GASTROINTESTINAL NEGATIVE: 1
FEVER: 0
COUGH: 1
HEMATOLOGIC/LYMPHATIC NEGATIVE: 1
ENDOCRINE NEGATIVE: 1
SHORTNESS OF BREATH: 1
RHINORRHEA: 1
ALLERGIC/IMMUNOLOGIC NEGATIVE: 1
CHEST TIGHTNESS: 1
CONSTITUTIONAL NEGATIVE: 1
NEUROLOGICAL NEGATIVE: 1
MUSCULOSKELETAL NEGATIVE: 1

## 2024-01-19 NOTE — PATIENT INSTRUCTIONS
1.  Chest discomfort with coughing shortness of breath.    I am concerned you develop community-acquired pneumonia.    On exam today you have rhonchi wheezing rales in the bases bilaterally right worse than left    You received an injection of an antibiotic in the office Rocephin/ceftriaxone 1 g.    Please have a chest x-ray done after leaving the office.  I will contact you with those results    Antibiotics will be sent to the pharmacy after I look at the chest x-ray.  Most likely Levaquin 750 mg once a day for 7 days.  Albuterol inhaler 2 puffs every 4-6 hours for coughing wheezing.  Tessalon Perles 200 mg every 8 hours for the cough.    You must stay well-hydrated    Keep smoking to a minimum    If you worsen you need to call as it could indicate you need to be given IV antibiotics.

## 2024-01-19 NOTE — PROGRESS NOTES
Subjective   Patient ID: Madhu Shah is a 58 y.o. male who presents for Sinusitis and URI (States URI symptoms and of a cold/covid like. Coughing, runny nose, no fever last several days, tested for covid x3 and all negative, tightness to sides towards back and sob when lifting going up stairs.).    Sinusitis  Associated symptoms include congestion, coughing and shortness of breath.   URI   Associated symptoms include congestion, coughing and rhinorrhea.        The patient mentions the cough is causing great discomfort. He mentions that it feels like a strap around his chest. He was lifting a basket of laundry up the stairs and could not finish the job due to shortness of breath. He has been experiencing cold symptoms and sinus symptoms for approximately a week. He has coughing, rhinorrhea and is afebrile. He has tested for COVID-19 3 times and was negative.    The patient uses vape instead of tobacco products recently.    Review of Systems   Constitutional: Negative.  Negative for fever.   HENT:  Positive for congestion and rhinorrhea.    Eyes: Negative.    Respiratory:  Positive for cough, chest tightness and shortness of breath.    Cardiovascular: Negative.    Gastrointestinal: Negative.    Endocrine: Negative.    Genitourinary: Negative.    Musculoskeletal: Negative.    Skin: Negative.    Allergic/Immunologic: Negative.    Neurological: Negative.    Hematological: Negative.    Psychiatric/Behavioral: Negative.         Objective   /73 (BP Location: Left arm, Patient Position: Sitting, BP Cuff Size: Large adult)   Pulse 72   Temp 36.4 °C (97.6 °F) (Temporal)   Wt 91.7 kg (202 lb 3.2 oz)   SpO2 97%   BMI 29.01 kg/m²     Physical Exam  Constitutional:       Appearance: Normal appearance.   HENT:      Head: Normocephalic and atraumatic.      Right Ear: Tympanic membrane is erythematous.      Left Ear: Tympanic membrane is erythematous.      Nose: Nose normal.      Mouth/Throat:      Pharynx: Posterior  oropharyngeal erythema present.   Eyes:      Extraocular Movements: Extraocular movements intact.      Conjunctiva/sclera: Conjunctivae normal.      Pupils: Pupils are equal, round, and reactive to light.   Cardiovascular:      Rate and Rhythm: Normal rate and regular rhythm.      Pulses: Normal pulses.      Heart sounds: Normal heart sounds.   Pulmonary:      Effort: Pulmonary effort is normal.      Breath sounds: Normal air entry. Wheezing and rhonchi present.   Abdominal:      General: Bowel sounds are normal.      Palpations: Abdomen is soft.   Musculoskeletal:         General: Normal range of motion.      Cervical back: Normal range of motion.   Neurological:      Mental Status: He is alert.   Psychiatric:         Mood and Affect: Mood normal.         Behavior: Behavior normal.         Thought Content: Thought content normal.         Judgment: Judgment normal.         Assessment/Plan   Problem List Items Addressed This Visit    None  Visit Diagnoses         Codes    Community acquired bacterial pneumonia    -  Primary J15.9    Relevant Medications    albuterol (Ventolin HFA) 90 mcg/actuation inhaler    cefTRIAXone (Rocephin) vial 1 g (Completed)    benzonatate (Tessalon) 200 mg capsule    levoFLOXacin (Levaquin) 750 mg tablet    Other Relevant Orders    XR chest 2 views (Completed)    Sinusitis, unspecified chronicity, unspecified location     J32.9    Upper respiratory tract infection, unspecified type     J06.9    Relevant Medications    albuterol (Ventolin HFA) 90 mcg/actuation inhaler    Other Relevant Orders    XR chest 2 views (Completed)               1. Community acquired bacterial pneumonia  albuterol (Ventolin HFA) 90 mcg/actuation inhaler    cefTRIAXone (Rocephin) vial 1 g    XR chest 2 views    benzonatate (Tessalon) 200 mg capsule    levoFLOXacin (Levaquin) 750 mg tablet      2. Sinusitis, unspecified chronicity, unspecified location        3. Upper respiratory tract infection, unspecified type   albuterol (Ventolin HFA) 90 mcg/actuation inhaler    XR chest 2 views        1.  Chest discomfort with coughing shortness of breath.    I am concerned you develop community-acquired pneumonia.    On exam today you have rhonchi wheezing rales in the bases bilaterally right worse than left    You received an injection of an antibiotic in the office Rocephin/ceftriaxone 1 g.    Please have a chest x-ray done after leaving the office.  I will contact you with those results    Antibiotics will be sent to the pharmacy after I look at the chest x-ray.  Most likely Levaquin 750 mg once a day for 7 days.  Albuterol inhaler 2 puffs every 4-6 hours for coughing wheezing.  Tessalon Perles 200 mg every 8 hours for the cough.    You must stay well-hydrated    Keep smoking to a minimum    If you worsen you need to call as it could indicate you need to be given IV antibiotics.    Follow-up in 6 months or sooner if there are any concerns.    Scribe Attestation  By signing my name below, INancie, Scribe   attest that this documentation has been prepared under the direction and in the presence of Cole Mix MD on 1/19/2024 at 9:00am EST.

## 2024-01-19 NOTE — TELEPHONE ENCOUNTER
Call and tell the patient that I reviewed the chest xray and I am worried about a left lower lobe pneumonia,  I want him to start on Levaquin 750 mg once a day,  I sent this to the pharmacy

## 2024-01-19 NOTE — TELEPHONE ENCOUNTER
Patient is aware and verbalized understanding, patient has no questions or concerns at this time.

## 2024-01-29 DIAGNOSIS — E11.9 TYPE 2 DIABETES MELLITUS WITHOUT COMPLICATION, WITHOUT LONG-TERM CURRENT USE OF INSULIN (MULTI): Primary | ICD-10-CM

## 2024-01-29 RX ORDER — DAPAGLIFLOZIN 10 MG/1
10 TABLET, FILM COATED ORAL DAILY
Qty: 90 TABLET | Refills: 3 | Status: SHIPPED | OUTPATIENT
Start: 2024-01-29

## 2024-02-15 DIAGNOSIS — E11.9 TYPE 2 DIABETES MELLITUS WITHOUT COMPLICATION, WITHOUT LONG-TERM CURRENT USE OF INSULIN (MULTI): Primary | ICD-10-CM

## 2024-02-15 DIAGNOSIS — E78.2 MIXED HYPERLIPIDEMIA: ICD-10-CM

## 2024-02-15 DIAGNOSIS — I49.9 CARDIAC ARRHYTHMIA, UNSPECIFIED CARDIAC ARRHYTHMIA TYPE: ICD-10-CM

## 2024-02-15 RX ORDER — METFORMIN HYDROCHLORIDE 500 MG/1
2000 TABLET, EXTENDED RELEASE ORAL DAILY
Qty: 360 TABLET | Refills: 3 | Status: SHIPPED | OUTPATIENT
Start: 2024-02-15

## 2024-02-15 RX ORDER — GLIMEPIRIDE 4 MG/1
4 TABLET ORAL DAILY
Qty: 90 TABLET | Refills: 3 | Status: SHIPPED | OUTPATIENT
Start: 2024-02-15

## 2024-02-15 RX ORDER — ATORVASTATIN CALCIUM 40 MG/1
40 TABLET, FILM COATED ORAL DAILY
Qty: 90 TABLET | Refills: 3 | Status: SHIPPED | OUTPATIENT
Start: 2024-02-15

## 2024-02-15 RX ORDER — METOPROLOL TARTRATE 50 MG/1
50 TABLET ORAL EVERY 12 HOURS
Qty: 180 TABLET | Refills: 3 | Status: SHIPPED | OUTPATIENT
Start: 2024-02-15

## 2024-02-22 ENCOUNTER — LAB (OUTPATIENT)
Dept: LAB | Facility: LAB | Age: 59
End: 2024-02-22
Payer: COMMERCIAL

## 2024-02-22 DIAGNOSIS — Z00.00 WELLNESS EXAMINATION: ICD-10-CM

## 2024-02-22 DIAGNOSIS — Z12.5 PROSTATE CANCER SCREENING: ICD-10-CM

## 2024-02-22 DIAGNOSIS — E11.9 TYPE 2 DIABETES MELLITUS WITHOUT COMPLICATION, WITH LONG-TERM CURRENT USE OF INSULIN (MULTI): ICD-10-CM

## 2024-02-22 DIAGNOSIS — Z79.4 TYPE 2 DIABETES MELLITUS WITHOUT COMPLICATION, WITH LONG-TERM CURRENT USE OF INSULIN (MULTI): ICD-10-CM

## 2024-02-22 LAB
ALBUMIN SERPL BCP-MCNC: 4 G/DL (ref 3.4–5)
ALP SERPL-CCNC: 94 U/L (ref 33–120)
ALT SERPL W P-5'-P-CCNC: 23 U/L (ref 10–52)
ANION GAP SERPL CALC-SCNC: 15 MMOL/L (ref 10–20)
AST SERPL W P-5'-P-CCNC: 15 U/L (ref 9–39)
BILIRUB SERPL-MCNC: 0.8 MG/DL (ref 0–1.2)
BUN SERPL-MCNC: 11 MG/DL (ref 6–23)
CALCIUM SERPL-MCNC: 9.3 MG/DL (ref 8.6–10.6)
CHLORIDE SERPL-SCNC: 104 MMOL/L (ref 98–107)
CHOLEST SERPL-MCNC: 122 MG/DL (ref 0–199)
CHOLESTEROL/HDL RATIO: 4.4
CO2 SERPL-SCNC: 26 MMOL/L (ref 21–32)
CREAT SERPL-MCNC: 0.89 MG/DL (ref 0.5–1.3)
CREAT UR-MCNC: 90.7 MG/DL (ref 20–370)
EGFRCR SERPLBLD CKD-EPI 2021: >90 ML/MIN/1.73M*2
ERYTHROCYTE [DISTWIDTH] IN BLOOD BY AUTOMATED COUNT: 12.6 % (ref 11.5–14.5)
EST. AVERAGE GLUCOSE BLD GHB EST-MCNC: 166 MG/DL
GLUCOSE SERPL-MCNC: 159 MG/DL (ref 74–99)
HBA1C MFR BLD: 7.4 %
HCT VFR BLD AUTO: 54 % (ref 41–52)
HDLC SERPL-MCNC: 27.5 MG/DL
HGB BLD-MCNC: 17.7 G/DL (ref 13.5–17.5)
LDLC SERPL CALC-MCNC: 39 MG/DL
MCH RBC QN AUTO: 31.4 PG (ref 26–34)
MCHC RBC AUTO-ENTMCNC: 32.8 G/DL (ref 32–36)
MCV RBC AUTO: 96 FL (ref 80–100)
MICROALBUMIN UR-MCNC: <7 MG/L
MICROALBUMIN/CREAT UR: NORMAL MG/G{CREAT}
NON HDL CHOLESTEROL: 95 MG/DL (ref 0–149)
NRBC BLD-RTO: 0 /100 WBCS (ref 0–0)
PLATELET # BLD AUTO: 151 X10*3/UL (ref 150–450)
POTASSIUM SERPL-SCNC: 4.5 MMOL/L (ref 3.5–5.3)
PROT SERPL-MCNC: 6.8 G/DL (ref 6.4–8.2)
PSA SERPL-MCNC: 1.8 NG/ML
RBC # BLD AUTO: 5.63 X10*6/UL (ref 4.5–5.9)
SODIUM SERPL-SCNC: 140 MMOL/L (ref 136–145)
TRIGL SERPL-MCNC: 279 MG/DL (ref 0–149)
VLDL: 56 MG/DL (ref 0–40)
WBC # BLD AUTO: 9.6 X10*3/UL (ref 4.4–11.3)

## 2024-02-22 PROCEDURE — 80053 COMPREHEN METABOLIC PANEL: CPT

## 2024-02-22 PROCEDURE — 83036 HEMOGLOBIN GLYCOSYLATED A1C: CPT

## 2024-02-22 PROCEDURE — 80061 LIPID PANEL: CPT

## 2024-02-22 PROCEDURE — 82570 ASSAY OF URINE CREATININE: CPT

## 2024-02-22 PROCEDURE — 84153 ASSAY OF PSA TOTAL: CPT

## 2024-02-22 PROCEDURE — 36415 COLL VENOUS BLD VENIPUNCTURE: CPT

## 2024-02-22 PROCEDURE — 82043 UR ALBUMIN QUANTITATIVE: CPT

## 2024-02-22 PROCEDURE — 85027 COMPLETE CBC AUTOMATED: CPT

## 2024-02-26 ENCOUNTER — APPOINTMENT (OUTPATIENT)
Dept: PRIMARY CARE | Facility: CLINIC | Age: 59
End: 2024-02-26
Payer: COMMERCIAL

## 2024-03-13 ENCOUNTER — OFFICE VISIT (OUTPATIENT)
Dept: PRIMARY CARE | Facility: CLINIC | Age: 59
End: 2024-03-13
Payer: COMMERCIAL

## 2024-03-13 VITALS
SYSTOLIC BLOOD PRESSURE: 129 MMHG | WEIGHT: 209.8 LBS | HEART RATE: 66 BPM | TEMPERATURE: 97.6 F | OXYGEN SATURATION: 97 % | BODY MASS INDEX: 30.03 KG/M2 | HEIGHT: 70 IN | DIASTOLIC BLOOD PRESSURE: 67 MMHG

## 2024-03-13 DIAGNOSIS — E55.9 VITAMIN D DEFICIENCY: ICD-10-CM

## 2024-03-13 DIAGNOSIS — Z79.4 TYPE 2 DIABETES MELLITUS WITHOUT COMPLICATION, WITH LONG-TERM CURRENT USE OF INSULIN (MULTI): ICD-10-CM

## 2024-03-13 DIAGNOSIS — R73.9 ELEVATED BLOOD SUGAR LEVEL: ICD-10-CM

## 2024-03-13 DIAGNOSIS — E11.9 TYPE 2 DIABETES MELLITUS WITHOUT COMPLICATION, WITH LONG-TERM CURRENT USE OF INSULIN (MULTI): ICD-10-CM

## 2024-03-13 DIAGNOSIS — Z00.00 WELLNESS EXAMINATION: ICD-10-CM

## 2024-03-13 DIAGNOSIS — Z00.00 PHYSICAL EXAM, ANNUAL: Primary | ICD-10-CM

## 2024-03-13 DIAGNOSIS — Z23 NEED FOR SHINGLES VACCINE: ICD-10-CM

## 2024-03-13 DIAGNOSIS — F17.200 TOBACCO DEPENDENCE: ICD-10-CM

## 2024-03-13 DIAGNOSIS — Z12.5 PROSTATE CANCER SCREENING: ICD-10-CM

## 2024-03-13 DIAGNOSIS — R53.83 OTHER FATIGUE: ICD-10-CM

## 2024-03-13 DIAGNOSIS — E78.5 HYPERLIPIDEMIA, UNSPECIFIED HYPERLIPIDEMIA TYPE: ICD-10-CM

## 2024-03-13 DIAGNOSIS — K21.9 GASTROESOPHAGEAL REFLUX DISEASE WITHOUT ESOPHAGITIS: ICD-10-CM

## 2024-03-13 PROCEDURE — 99396 PREV VISIT EST AGE 40-64: CPT | Performed by: FAMILY MEDICINE

## 2024-03-13 PROCEDURE — 3051F HG A1C>EQUAL 7.0%<8.0%: CPT | Performed by: FAMILY MEDICINE

## 2024-03-13 PROCEDURE — 3062F POS MACROALBUMINURIA REV: CPT | Performed by: FAMILY MEDICINE

## 2024-03-13 PROCEDURE — 3078F DIAST BP <80 MM HG: CPT | Performed by: FAMILY MEDICINE

## 2024-03-13 PROCEDURE — 99213 OFFICE O/P EST LOW 20 MIN: CPT | Performed by: FAMILY MEDICINE

## 2024-03-13 PROCEDURE — 3074F SYST BP LT 130 MM HG: CPT | Performed by: FAMILY MEDICINE

## 2024-03-13 PROCEDURE — 3048F LDL-C <100 MG/DL: CPT | Performed by: FAMILY MEDICINE

## 2024-03-13 RX ORDER — MORPHINE SULFATE 30 MG/1
30 TABLET, FILM COATED, EXTENDED RELEASE ORAL DAILY
COMMUNITY

## 2024-03-13 ASSESSMENT — ENCOUNTER SYMPTOMS
GASTROINTESTINAL NEGATIVE: 1
PSYCHIATRIC NEGATIVE: 1
HEMATOLOGIC/LYMPHATIC NEGATIVE: 1
MUSCULOSKELETAL NEGATIVE: 1
CONSTITUTIONAL NEGATIVE: 1
ALLERGIC/IMMUNOLOGIC NEGATIVE: 1
CARDIOVASCULAR NEGATIVE: 1
EYES NEGATIVE: 1
ENDOCRINE NEGATIVE: 1
RESPIRATORY NEGATIVE: 1
NEUROLOGICAL NEGATIVE: 1

## 2024-03-13 ASSESSMENT — PROMIS GLOBAL HEALTH SCALE
CARRYOUT_SOCIAL_ACTIVITIES: VERY GOOD
RATE_QUALITY_OF_LIFE: VERY GOOD
RATE_PHYSICAL_HEALTH: GOOD
CARRYOUT_PHYSICAL_ACTIVITIES: MOSTLY
RATE_AVERAGE_FATIGUE: MODERATE
RATE_GENERAL_HEALTH: GOOD
EMOTIONAL_PROBLEMS: OFTEN
RATE_AVERAGE_PAIN: 4
RATE_MENTAL_HEALTH: GOOD
RATE_SOCIAL_SATISFACTION: GOOD

## 2024-03-13 NOTE — PATIENT INSTRUCTIONS
1.  Well visit    Today in the office you had your annual wellness exam    You are up-to-date with your colonoscopy from 2023    Your PSA blood test is in the normal range indicating low risk for prostate disease    We reviewed other labs indicating that your blood sugars are close to goal with a hemoglobin A1c just slightly above 7.0.  Alesi you continue on all current medications which help lower your blood sugar as well as continue following a healthy diabetic diet and trying to walk exercise be active 30 minutes a day    The urine test is normal indicating no excessive protein in the urine    Please continue seeing your eye doctor at least once a year being checked for diabetic retinopathy.    We will repeat your hemoglobin A1c in 6 months    Continue on all current medications which help lower your cholesterol including your atorvastatin.  Your triglycerides are slightly elevated but not to the point where we would need medications.    You report some mild shortness of breath with exertion but no chest pains.    Your #1 goal for this calendar year should be to stop all tobacco use if you require assistance with medication or talking with a smoking cessation  please let me know    We had a good discussion in regards to grieving if you feel that your symptoms of grieving are starting to cause you to have a significant reduction in the quality of your life you are having difficult time getting out of bed you are withdrawing from your friends and family please let me know in the meantime continue with a healthy relationship with your family and friends continue with daily prayer and meditation try to be outside for 30 minutes and natural sunlight    You are up-to-date with all vaccines including both shingles vaccines    I will see you back in 6 months please have repeat lab work prior to that appointment as well

## 2024-03-13 NOTE — PROGRESS NOTES
"Subjective   Patient ID: Madhu Shah is a 58 y.o. male who presents for Annual Exam (CPE).    HPI     The patient's mother passed recently. He was dealing with some grief and found it difficult to go through. She was 80 years old and it was most likely the ovarian cancer.    The patient denies any changes in vision, hearing or dental.     The patient maintains they do not have any chest pain, chest tightness or shortness of breath.    They do not experience nausea, emesis, changes in bowel movements or dyspepsia.    The patient denies nocturia. They report urinating 1 times a night. The nocturia does not cause sleep disturbances.     The patient denies any issues with erections.    The patient's colonoscopy is up to date.    The patient's vaccinations are up to date.      Review of Systems   Constitutional: Negative.    HENT: Negative.     Eyes: Negative.    Respiratory: Negative.     Cardiovascular: Negative.    Gastrointestinal: Negative.    Endocrine: Negative.    Genitourinary: Negative.    Musculoskeletal: Negative.    Skin: Negative.    Allergic/Immunologic: Negative.    Neurological: Negative.    Hematological: Negative.    Psychiatric/Behavioral: Negative.         Objective   /67 (BP Location: Left arm, Patient Position: Sitting, BP Cuff Size: Large adult)   Pulse 66   Temp 36.4 °C (97.6 °F) (Temporal)   Ht 1.784 m (5' 10.24\")   Wt 95.2 kg (209 lb 12.8 oz)   SpO2 97%   BMI 29.90 kg/m²     Physical Exam  Constitutional:       Appearance: Normal appearance.   HENT:      Head: Normocephalic and atraumatic.      Nose: Nose normal.   Eyes:      Extraocular Movements: Extraocular movements intact.      Conjunctiva/sclera: Conjunctivae normal.      Pupils: Pupils are equal, round, and reactive to light.   Cardiovascular:      Rate and Rhythm: Normal rate and regular rhythm.      Pulses: Normal pulses.      Heart sounds: Normal heart sounds.   Pulmonary:      Effort: Pulmonary effort is normal.      " Breath sounds: Normal breath sounds and air entry.   Abdominal:      General: Bowel sounds are normal.      Palpations: Abdomen is soft.   Musculoskeletal:         General: Normal range of motion.      Cervical back: Normal range of motion.   Neurological:      Mental Status: He is alert.   Psychiatric:         Mood and Affect: Mood normal.         Behavior: Behavior normal.         Thought Content: Thought content normal.         Judgment: Judgment normal.         Assessment/Plan   Problem List Items Addressed This Visit             ICD-10-CM    GERD (gastroesophageal reflux disease) K21.9    Hyperlipidemia E78.5    Relevant Orders    Lipid Panel    Comprehensive Metabolic Panel    CBC and Auto Differential    Follow Up In [facility] - PCP - Health Maintenance    Type 2 diabetes mellitus (CMS/[facility]) E11.9    Tobacco dependence F17.200    Wellness examination - Primary Z00.00    Need for shingles vaccine Z23     Other Visit Diagnoses         Codes    Vitamin D deficiency     E55.9    Relevant Orders    Vitamin D 25-Hydroxy,Total (for eval of Vitamin D levels)    Elevated blood sugar level     R73.9    Relevant Orders    Hemoglobin A1C    Other fatigue     R53.83    Relevant Orders    TSH with reflex to Free T4 if abnormal               1. Physical exam, annual        2. Tobacco dependence        3. Gastroesophageal reflux disease without esophagitis        4. Vitamin D deficiency  Vitamin D 25-Hydroxy,Total (for eval of Vitamin D levels)      5. Elevated blood sugar level  Hemoglobin A1C      6. Other fatigue  TSH with reflex to Free T4 if abnormal      7. Hyperlipidemia, unspecified hyperlipidemia type  Lipid Panel    Comprehensive Metabolic Panel    CBC and Auto Differential    Follow Up In [facility] - PCP - Health Maintenance      8. Wellness examination  Follow Up In [facility] - PCP - Health Maintenance      9. Prostate cancer screening  Prostate Specific Antigen, Screen      10.  Need for shingles vaccine  CANCELED: Zoster vaccine, recombinant, adult (SHINGRIX)      11. Type 2 diabetes mellitus without complication, with long-term current use of insulin (CMS/[facility])            1.  Well visit    Today in the office you had your annual wellness exam    You are up-to-date with your colonoscopy from 2023    Your PSA blood test is in the normal range indicating low risk for prostate disease    We reviewed other labs indicating that your blood sugars are close to goal with a hemoglobin A1c just slightly above 7.0.  Alesi you continue on all current medications which help lower your blood sugar as well as continue following a healthy diabetic diet and trying to walk exercise be active 30 minutes a day    The urine test is normal indicating no excessive protein in the urine    Please continue seeing your eye doctor at least once a year being checked for diabetic retinopathy.    We will repeat your hemoglobin A1c in 6 months    Continue on all current medications which help lower your cholesterol including your atorvastatin.  Your triglycerides are slightly elevated but not to the point where we would need medications.    You report some mild shortness of breath with exertion but no chest pains.    Your #1 goal for this calendar year should be to stop all tobacco use if you require assistance with medication or talking with a smoking cessation  please let me know    We had a good discussion in regards to grieving if you feel that your symptoms of grieving are starting to cause you to have a significant reduction in the quality of your life you are having difficult time getting out of bed you are withdrawing from your friends and family please let me know in the meantime continue with a healthy relationship with your family and friends continue with daily prayer and meditation try to be outside for 30 minutes and natural sunlight    You are up-to-date with all vaccines including both shingles  vaccines    I will see you back in 6 months please have repeat lab work prior to that appointment as well    Follow-up in 6 months or sooner if there are any concerns.    Scribe Attestation  By signing my name below, I, Nancie Wilkerson, [place]   attest that this documentation has been prepared under the direction and in the presence of Cole Mix MD.    This note has been transcribed using a medical scribe and there is a possibility of unintentional typing misprints.

## 2024-03-14 PROBLEM — Z00.00 PHYSICAL EXAM, ANNUAL: Status: ACTIVE | Noted: 2024-03-14

## 2024-03-14 PROBLEM — Z23 NEED FOR SHINGLES VACCINE: Status: ACTIVE | Noted: 2024-03-14

## 2024-03-14 PROBLEM — Z12.5 PROSTATE CANCER SCREENING: Status: ACTIVE | Noted: 2024-03-14

## 2024-05-02 PROCEDURE — 80361 OPIATES 1 OR MORE: CPT

## 2024-05-02 PROCEDURE — 80307 DRUG TEST PRSMV CHEM ANLYZR: CPT

## 2024-05-02 PROCEDURE — 80365 DRUG SCREENING OXYCODONE: CPT

## 2024-05-03 ENCOUNTER — LAB (OUTPATIENT)
Dept: LAB | Facility: LAB | Age: 59
End: 2024-05-03
Payer: COMMERCIAL

## 2024-05-03 ENCOUNTER — LAB REQUISITION (OUTPATIENT)
Dept: LAB | Facility: HOSPITAL | Age: 59
End: 2024-05-03
Payer: COMMERCIAL

## 2024-05-03 DIAGNOSIS — Z79.891 LONG TERM (CURRENT) USE OF OPIATE ANALGESIC: ICD-10-CM

## 2024-05-03 LAB
AMPHETAMINES UR QL SCN: ABNORMAL
BARBITURATES UR QL SCN: ABNORMAL
BENZODIAZ UR QL SCN: ABNORMAL
BZE UR QL SCN: ABNORMAL
CANNABINOIDS UR QL SCN: ABNORMAL
FENTANYL+NORFENTANYL UR QL SCN: ABNORMAL
METHADONE UR QL SCN: ABNORMAL
OPIATES UR QL SCN: ABNORMAL
OXYCODONE+OXYMORPHONE UR QL SCN: ABNORMAL
PCP UR QL SCN: ABNORMAL

## 2024-05-09 LAB
6MAM UR CFM-MCNC: <25 NG/ML
CODEINE UR CFM-MCNC: <50 NG/ML
HYDROCODONE CTO UR CFM-MCNC: <25 NG/ML
HYDROMORPHONE UR CFM-MCNC: 70 NG/ML
MORPHINE UR CFM-MCNC: >2500 NG/ML
NORHYDROCODONE UR CFM-MCNC: <25 NG/ML
NOROXYCODONE UR CFM-MCNC: <25 NG/ML
OXYCODONE UR CFM-MCNC: <25 NG/ML
OXYMORPHONE UR CFM-MCNC: <25 NG/ML

## 2024-09-19 ASSESSMENT — PROMIS GLOBAL HEALTH SCALE
RATE_MENTAL_HEALTH: GOOD
EMOTIONAL_PROBLEMS: SOMETIMES
RATE_AVERAGE_PAIN: 4
RATE_PHYSICAL_HEALTH: GOOD
CARRYOUT_PHYSICAL_ACTIVITIES: MOSTLY
RATE_GENERAL_HEALTH: GOOD
RATE_AVERAGE_FATIGUE: MILD
RATE_SOCIAL_SATISFACTION: GOOD
CARRYOUT_SOCIAL_ACTIVITIES: GOOD
RATE_QUALITY_OF_LIFE: VERY GOOD

## 2024-09-20 ENCOUNTER — APPOINTMENT (OUTPATIENT)
Dept: PRIMARY CARE | Facility: CLINIC | Age: 59
End: 2024-09-20
Payer: COMMERCIAL

## 2024-09-20 ENCOUNTER — LAB (OUTPATIENT)
Dept: LAB | Facility: LAB | Age: 59
End: 2024-09-20
Payer: COMMERCIAL

## 2024-09-20 VITALS
WEIGHT: 209 LBS | BODY MASS INDEX: 29.78 KG/M2 | TEMPERATURE: 97.5 F | DIASTOLIC BLOOD PRESSURE: 64 MMHG | OXYGEN SATURATION: 97 % | SYSTOLIC BLOOD PRESSURE: 121 MMHG | HEART RATE: 64 BPM

## 2024-09-20 DIAGNOSIS — R73.9 ELEVATED BLOOD SUGAR LEVEL: ICD-10-CM

## 2024-09-20 DIAGNOSIS — Z86.718 HISTORY OF DVT (DEEP VEIN THROMBOSIS): ICD-10-CM

## 2024-09-20 DIAGNOSIS — Z12.5 PROSTATE CANCER SCREENING: ICD-10-CM

## 2024-09-20 DIAGNOSIS — I82.409 RECURRENT DEEP VEIN THROMBOSIS (DVT) OF LOWER EXTREMITY, UNSPECIFIED LATERALITY (MULTI): ICD-10-CM

## 2024-09-20 DIAGNOSIS — E11.9 TYPE 2 DIABETES MELLITUS WITHOUT COMPLICATION, WITHOUT LONG-TERM CURRENT USE OF INSULIN (MULTI): ICD-10-CM

## 2024-09-20 DIAGNOSIS — E55.9 VITAMIN D DEFICIENCY: ICD-10-CM

## 2024-09-20 DIAGNOSIS — I47.10 SVT (SUPRAVENTRICULAR TACHYCARDIA) (CMS-HCC): ICD-10-CM

## 2024-09-20 DIAGNOSIS — R53.83 OTHER FATIGUE: ICD-10-CM

## 2024-09-20 DIAGNOSIS — I49.9 CARDIAC ARRHYTHMIA, UNSPECIFIED CARDIAC ARRHYTHMIA TYPE: ICD-10-CM

## 2024-09-20 DIAGNOSIS — Z00.00 WELLNESS EXAMINATION: ICD-10-CM

## 2024-09-20 DIAGNOSIS — Z09 ENCOUNTER FOR FOLLOW-UP: ICD-10-CM

## 2024-09-20 DIAGNOSIS — E78.5 HYPERLIPIDEMIA, UNSPECIFIED HYPERLIPIDEMIA TYPE: ICD-10-CM

## 2024-09-20 DIAGNOSIS — F17.200 TOBACCO DEPENDENCE: ICD-10-CM

## 2024-09-20 DIAGNOSIS — F51.01 PRIMARY INSOMNIA: ICD-10-CM

## 2024-09-20 DIAGNOSIS — M54.12 CERVICAL RADICULOPATHY: ICD-10-CM

## 2024-09-20 DIAGNOSIS — E11.9 TYPE 2 DIABETES MELLITUS WITHOUT COMPLICATION, WITHOUT LONG-TERM CURRENT USE OF INSULIN (MULTI): Primary | ICD-10-CM

## 2024-09-20 DIAGNOSIS — K21.9 GASTROESOPHAGEAL REFLUX DISEASE WITHOUT ESOPHAGITIS: ICD-10-CM

## 2024-09-20 DIAGNOSIS — N52.2 DRUG-INDUCED ERECTILE DYSFUNCTION: ICD-10-CM

## 2024-09-20 DIAGNOSIS — E78.2 MIXED HYPERLIPIDEMIA: ICD-10-CM

## 2024-09-20 LAB
25(OH)D3 SERPL-MCNC: 42 NG/ML (ref 30–100)
ALBUMIN SERPL BCP-MCNC: 4 G/DL (ref 3.4–5)
ALP SERPL-CCNC: 102 U/L (ref 33–120)
ALT SERPL W P-5'-P-CCNC: 20 U/L (ref 10–52)
ANION GAP SERPL CALC-SCNC: 14 MMOL/L (ref 10–20)
AST SERPL W P-5'-P-CCNC: 16 U/L (ref 9–39)
BASOPHILS # BLD AUTO: 0.04 X10*3/UL (ref 0–0.1)
BASOPHILS NFR BLD AUTO: 0.4 %
BILIRUB SERPL-MCNC: 1.2 MG/DL (ref 0–1.2)
BUN SERPL-MCNC: 10 MG/DL (ref 6–23)
CALCIUM SERPL-MCNC: 8.9 MG/DL (ref 8.6–10.6)
CHLORIDE SERPL-SCNC: 105 MMOL/L (ref 98–107)
CHOLEST SERPL-MCNC: 123 MG/DL (ref 0–199)
CHOLESTEROL/HDL RATIO: 4.7
CO2 SERPL-SCNC: 26 MMOL/L (ref 21–32)
CREAT SERPL-MCNC: 0.96 MG/DL (ref 0.5–1.3)
EGFRCR SERPLBLD CKD-EPI 2021: >90 ML/MIN/1.73M*2
EOSINOPHIL # BLD AUTO: 0.15 X10*3/UL (ref 0–0.7)
EOSINOPHIL NFR BLD AUTO: 1.6 %
ERYTHROCYTE [DISTWIDTH] IN BLOOD BY AUTOMATED COUNT: 12.7 % (ref 11.5–14.5)
EST. AVERAGE GLUCOSE BLD GHB EST-MCNC: 180 MG/DL
GLUCOSE SERPL-MCNC: 133 MG/DL (ref 74–99)
HBA1C MFR BLD: 7.9 %
HCT VFR BLD AUTO: 53.3 % (ref 41–52)
HDLC SERPL-MCNC: 26.1 MG/DL
HGB BLD-MCNC: 17.8 G/DL (ref 13.5–17.5)
IMM GRANULOCYTES # BLD AUTO: 0.05 X10*3/UL (ref 0–0.7)
IMM GRANULOCYTES NFR BLD AUTO: 0.5 % (ref 0–0.9)
LDLC SERPL CALC-MCNC: 50 MG/DL
LYMPHOCYTES # BLD AUTO: 2.71 X10*3/UL (ref 1.2–4.8)
LYMPHOCYTES NFR BLD AUTO: 28.2 %
MCH RBC QN AUTO: 31.9 PG (ref 26–34)
MCHC RBC AUTO-ENTMCNC: 33.4 G/DL (ref 32–36)
MCV RBC AUTO: 96 FL (ref 80–100)
MONOCYTES # BLD AUTO: 0.95 X10*3/UL (ref 0.1–1)
MONOCYTES NFR BLD AUTO: 9.9 %
NEUTROPHILS # BLD AUTO: 5.72 X10*3/UL (ref 1.2–7.7)
NEUTROPHILS NFR BLD AUTO: 59.4 %
NON HDL CHOLESTEROL: 97 MG/DL (ref 0–149)
NRBC BLD-RTO: 0 /100 WBCS (ref 0–0)
PLATELET # BLD AUTO: 161 X10*3/UL (ref 150–450)
POTASSIUM SERPL-SCNC: 4 MMOL/L (ref 3.5–5.3)
PROT SERPL-MCNC: 6.7 G/DL (ref 6.4–8.2)
PSA SERPL-MCNC: 2.41 NG/ML
RBC # BLD AUTO: 5.58 X10*6/UL (ref 4.5–5.9)
SODIUM SERPL-SCNC: 141 MMOL/L (ref 136–145)
TRIGL SERPL-MCNC: 233 MG/DL (ref 0–149)
TSH SERPL-ACNC: 1.65 MIU/L (ref 0.44–3.98)
VLDL: 47 MG/DL (ref 0–40)
WBC # BLD AUTO: 9.6 X10*3/UL (ref 4.4–11.3)

## 2024-09-20 PROCEDURE — 82306 VITAMIN D 25 HYDROXY: CPT

## 2024-09-20 PROCEDURE — 80053 COMPREHEN METABOLIC PANEL: CPT

## 2024-09-20 PROCEDURE — 83036 HEMOGLOBIN GLYCOSYLATED A1C: CPT

## 2024-09-20 PROCEDURE — 84153 ASSAY OF PSA TOTAL: CPT

## 2024-09-20 PROCEDURE — 85025 COMPLETE CBC W/AUTO DIFF WBC: CPT

## 2024-09-20 PROCEDURE — 80061 LIPID PANEL: CPT

## 2024-09-20 PROCEDURE — 36415 COLL VENOUS BLD VENIPUNCTURE: CPT

## 2024-09-20 PROCEDURE — 84443 ASSAY THYROID STIM HORMONE: CPT

## 2024-09-20 RX ORDER — METFORMIN HYDROCHLORIDE 500 MG/1
2000 TABLET, EXTENDED RELEASE ORAL DAILY
Qty: 360 TABLET | Refills: 3 | Status: SHIPPED | OUTPATIENT
Start: 2024-09-20

## 2024-09-20 RX ORDER — DAPAGLIFLOZIN 10 MG/1
10 TABLET, FILM COATED ORAL DAILY
Qty: 90 TABLET | Refills: 3 | Status: SHIPPED | OUTPATIENT
Start: 2024-09-20

## 2024-09-20 RX ORDER — TRAZODONE HYDROCHLORIDE 50 MG/1
50 TABLET ORAL NIGHTLY
Qty: 90 TABLET | Refills: 3 | Status: SHIPPED | OUTPATIENT
Start: 2024-09-20 | End: 2025-09-20

## 2024-09-20 RX ORDER — SILDENAFIL 100 MG/1
100 TABLET, FILM COATED ORAL AS NEEDED
Qty: 12 TABLET | Refills: 11 | Status: SHIPPED | OUTPATIENT
Start: 2024-09-20

## 2024-09-20 RX ORDER — DAPAGLIFLOZIN 10 MG/1
10 TABLET, FILM COATED ORAL DAILY
Qty: 90 TABLET | Refills: 3 | Status: SHIPPED | OUTPATIENT
Start: 2024-09-20 | End: 2024-09-20 | Stop reason: SDUPTHER

## 2024-09-20 RX ORDER — DAPAGLIFLOZIN 10 MG/1
10 TABLET, FILM COATED ORAL DAILY
Qty: 90 TABLET | Refills: 3 | Status: SHIPPED | OUTPATIENT
Start: 2024-09-20 | End: 2024-09-20 | Stop reason: ENTERED-IN-ERROR

## 2024-09-20 RX ORDER — ATORVASTATIN CALCIUM 40 MG/1
40 TABLET, FILM COATED ORAL DAILY
Qty: 90 TABLET | Refills: 3 | Status: SHIPPED | OUTPATIENT
Start: 2024-09-20

## 2024-09-20 RX ORDER — METOPROLOL TARTRATE 50 MG/1
50 TABLET ORAL EVERY 12 HOURS
Qty: 180 TABLET | Refills: 3 | Status: SHIPPED | OUTPATIENT
Start: 2024-09-20

## 2024-09-20 RX ORDER — GLIMEPIRIDE 4 MG/1
4 TABLET ORAL DAILY
Qty: 90 TABLET | Refills: 3 | Status: SHIPPED | OUTPATIENT
Start: 2024-09-20

## 2024-09-20 ASSESSMENT — ENCOUNTER SYMPTOMS
MUSCULOSKELETAL NEGATIVE: 1
NAUSEA: 0
CONSTIPATION: 0
RESPIRATORY NEGATIVE: 1
ENDOCRINE NEGATIVE: 1
ALLERGIC/IMMUNOLOGIC NEGATIVE: 1
EYES NEGATIVE: 1
NEUROLOGICAL NEGATIVE: 1
CONSTITUTIONAL NEGATIVE: 1
SHORTNESS OF BREATH: 0
CHEST TIGHTNESS: 0
VOMITING: 0
GASTROINTESTINAL NEGATIVE: 1
HEMATOLOGIC/LYMPHATIC NEGATIVE: 1
DIARRHEA: 0
CARDIOVASCULAR NEGATIVE: 1
PSYCHIATRIC NEGATIVE: 1

## 2024-09-20 NOTE — PROGRESS NOTES
Subjective   Patient ID: Madhu Shah is a 59 y.o. male who presents for Annual Exam (6mo fuv dm, chol,  states has some electrical type feeling from neck radiating down left arm lately lasting a minute then goes away and comes and goes in no particular fasion).    HPI     The patient mentions having paraesthesia from his neck into left arm. It is happening intermittently lasting a few minutes before resolving. He is wondering why this is happening.    Hyperlipidemia: The patient is presenting today for a follow up of hyperlipidemia. The patient has not been hospitalized for this in the last 6 months. The patient is compliant with medications. Patient denies any side effects to the medications.     Diabetes Mellitus: The patient presents today for a follow up of DM.The patient has not been hospitalized for this in the last 6 months.  Patient denies any side effects to the medications.     The patient is compliant with medications.  The patient is not on insulin.     Arthritis: Madhu Shah is an 59 y.o. male who presents with arthralgias. Pain is located in multiple joints. The patient has not been hospitalized for this in the last 6 months. The patient is compliant with medications. Patient denies any side effects to the medications.     The patient denies any changes in vision, hearing or dental.     The patient maintains they do not have any chest pain, chest tightness or shortness of breath.    They do not experience nausea, emesis, changes in bowel movements or dyspepsia.    The patient denies nocturia. They report urinating 1 times a night. The nocturia does not cause sleep disturbances.     The patient denies any issues with erections.    The patient's colonoscopy is up to date.    The patient's vaccinations are up to date.      Review of Systems   Constitutional: Negative.    HENT: Negative.  Negative for dental problem and hearing loss.    Eyes: Negative.  Negative for visual disturbance.    Respiratory: Negative.  Negative for chest tightness and shortness of breath.    Cardiovascular: Negative.  Negative for chest pain.   Gastrointestinal: Negative.  Negative for constipation, diarrhea, nausea and vomiting.   Endocrine: Negative.    Genitourinary: Negative.    Musculoskeletal: Negative.    Skin: Negative.    Allergic/Immunologic: Negative.    Neurological: Negative.    Hematological: Negative.    Psychiatric/Behavioral: Negative.         Objective   /64 (BP Location: Left arm, Patient Position: Sitting, BP Cuff Size: Large adult)   Pulse 64   Temp 36.4 °C (97.5 °F) (Temporal)   Wt 94.8 kg (209 lb)   SpO2 97%   BMI 29.78 kg/m²     Physical Exam  Constitutional:       Appearance: Normal appearance.   HENT:      Head: Normocephalic and atraumatic.      Nose: Nose normal.   Eyes:      Extraocular Movements: Extraocular movements intact.      Conjunctiva/sclera: Conjunctivae normal.      Pupils: Pupils are equal, round, and reactive to light.   Cardiovascular:      Rate and Rhythm: Normal rate and regular rhythm.      Pulses: Normal pulses.      Heart sounds: Normal heart sounds.   Pulmonary:      Effort: Pulmonary effort is normal.      Breath sounds: Normal breath sounds and air entry.   Abdominal:      General: Bowel sounds are normal.      Palpations: Abdomen is soft.   Musculoskeletal:         General: Normal range of motion.      Cervical back: Normal range of motion.   Neurological:      Mental Status: He is alert.   Psychiatric:         Mood and Affect: Mood normal.         Behavior: Behavior normal.         Thought Content: Thought content normal.         Judgment: Judgment normal.         Assessment/Plan          1. Type 2 diabetes mellitus without complication, without long-term current use of insulin (Multi)  glimepiride (Amaryl) 4 mg tablet    metFORMIN  mg 24 hr tablet    DISCONTINUED: dapagliflozin propanediol (Farxiga) 10 mg    DISCONTINUED: dapagliflozin propanediol  (Farxiga) 10 mg      2. Wellness examination  Follow Up In [facility] - PCP - Health Maintenance      3. Tobacco dependence        4. Encounter for follow-up  Follow Up In [facility] - PCP - Health Maintenance      5. Prostate cancer screening        6. Hyperlipidemia, unspecified hyperlipidemia type  Follow Up In [facility] - PCP - Health Maintenance    CBC and Auto Differential    Comprehensive Metabolic Panel    Lipid Panel      7. Other fatigue  TSH with reflex to Free T4 if abnormal      8. Elevated blood sugar level  Hemoglobin A1C      9. Vitamin D deficiency  Vitamin D 25-Hydroxy,Total (for eval of Vitamin D levels)      10. Drug-induced erectile dysfunction  sildenafil (Viagra) 100 mg tablet      11. Mixed hyperlipidemia  atorvastatin (Lipitor) 40 mg tablet      12. Cardiac arrhythmia, unspecified cardiac arrhythmia type  metoprolol tartrate (Lopressor) 50 mg tablet      13. Recurrent deep vein thrombosis (DVT) of lower extremity, unspecified laterality (Multi)  rivaroxaban (Xarelto) 20 mg tablet      14. Primary insomnia  traZODone (Desyrel) 50 mg tablet      15. Cervical radiculopathy  XR cervical spine complete 4-5 views      16. History of DVT (deep vein thrombosis)        17. Gastroesophageal reflux disease without esophagitis        18. SVT (supraventricular tachycardia) (CMS-HCC)          1.  Type 2 diabetes    We had a good discussion regards to your recent hemoglobin A1c which is slightly elevated.  Goal for hemoglobin A1c is less than 7.0.    You are currently taking the Farxiga and the glimepiride and the metformin.    Please keep eating a heart healthy diet try to reduce some of the fruits that are rich or high in carbohydrates.  Maybe try more the blueberries raspberries blackberries.  Continue to avoid the carbs such as breads and cereals.  Continue to avoid sugar pop.    Keep seeing your eye doctor at least once a year being checked for diabetic retinopathy.   With your next set of labs we will repeat your hemoglobin A1c and we will do the urine albumin test.    2.  Hypertension elevated cholesterol history of coronary calcification    You report no chest pain chest pressure chest tightness with activity or exertion.  The standard current doses of metoprolol 50 mg every 12 hours and your atorvastatin 40 mg a day.  And your Xarelto.    Exercising walking moving 30 minutes a day is ideal certainly will call if you have chest pains with activities    3.  Smoking cessation.    Had a good discussion regards to continuing to cut back on your use of tobacco.  It is safe to use nicotine replacement.  If you feel like you would benefit from medication such as Chantix please let me know    4.  Left arm radiculopathy.  You are describing an electrical discomfort down the arm does not go all the way to the hand.  He cannot really correlate it with movement of the neck.  There is been no trauma to your back or neck.  And recently it is slightly better.  We will put an order in for an x-ray of the neck in case it starts to come back we should see if you are pinching on that left side.    5.  Continue on the trazodone to help with sleep    6.  I will send in your prescriptions to the mail away pharmacy    You received your flu shot today    I will see you back in 6 months I am happy to see you sooner if needed    Follow-up in 6 months or sooner if there are any concerns.    Scribe Attestation  By signing my name below, INancie, [place]   attest that this documentation has been prepared under the direction and in the presence of Cole Mix MD.    This note has been transcribed using a medical scribe and there is a possibility of unintentional typing misprints.

## 2024-09-20 NOTE — PATIENT INSTRUCTIONS
1.  Type 2 diabetes    We had a good discussion regards to your recent hemoglobin A1c which is slightly elevated.  Goal for hemoglobin A1c is less than 7.0.    You are currently taking the Farxiga and the glimepiride and the metformin.    Please keep eating a heart healthy diet try to reduce some of the fruits that are rich or high in carbohydrates.  Maybe try more the blueberries raspberries blackberries.  Continue to avoid the carbs such as breads and cereals.  Continue to avoid sugar pop.    Keep seeing your eye doctor at least once a year being checked for diabetic retinopathy.  With your next set of labs we will repeat your hemoglobin A1c and we will do the urine albumin test.    2.  Hypertension elevated cholesterol history of coronary calcification    You report no chest pain chest pressure chest tightness with activity or exertion.  The standard current doses of metoprolol 50 mg every 12 hours and your atorvastatin 40 mg a day.  And your Xarelto.    Exercising walking moving 30 minutes a day is ideal certainly will call if you have chest pains with activities    3.  Smoking cessation.    Had a good discussion regards to continuing to cut back on your use of tobacco.  It is safe to use nicotine replacement.  If you feel like you would benefit from medication such as Chantix please let me know    4.  Left arm radiculopathy.  You are describing an electrical discomfort down the arm does not go all the way to the hand.  He cannot really correlate it with movement of the neck.  There is been no trauma to your back or neck.  And recently it is slightly better.  We will put an order in for an x-ray of the neck in case it starts to come back we should see if you are pinching on that left side.    5.  Continue on the trazodone to help with sleep    6.  I will send in your prescriptions to the mail away pharmacy    You received your flu shot today    I will see you back in 6 months I am happy to see you sooner if  needed

## 2024-09-23 DIAGNOSIS — E11.9 TYPE 2 DIABETES MELLITUS WITHOUT COMPLICATION, WITHOUT LONG-TERM CURRENT USE OF INSULIN (MULTI): ICD-10-CM

## 2024-09-23 RX ORDER — DAPAGLIFLOZIN 10 MG/1
10 TABLET, FILM COATED ORAL DAILY
Qty: 30 TABLET | Refills: 3 | Status: SHIPPED | OUTPATIENT
Start: 2024-09-23

## 2024-09-23 NOTE — TELEPHONE ENCOUNTER
The pt does need the medication to local pharmacy on file. He is not able to use manufacture coupon unless at local pharmacy

## 2025-01-14 DIAGNOSIS — E11.9 TYPE 2 DIABETES MELLITUS WITHOUT COMPLICATION, WITHOUT LONG-TERM CURRENT USE OF INSULIN (MULTI): ICD-10-CM

## 2025-01-14 RX ORDER — DAPAGLIFLOZIN 10 MG/1
10 TABLET, FILM COATED ORAL DAILY
Qty: 30 TABLET | Refills: 11 | Status: SHIPPED | OUTPATIENT
Start: 2025-01-14

## 2025-03-19 ENCOUNTER — APPOINTMENT (OUTPATIENT)
Dept: PRIMARY CARE | Facility: CLINIC | Age: 60
End: 2025-03-19
Payer: COMMERCIAL

## 2025-03-19 VITALS
WEIGHT: 211.3 LBS | DIASTOLIC BLOOD PRESSURE: 71 MMHG | OXYGEN SATURATION: 97 % | HEART RATE: 64 BPM | BODY MASS INDEX: 30.25 KG/M2 | TEMPERATURE: 97.5 F | HEIGHT: 70 IN | SYSTOLIC BLOOD PRESSURE: 121 MMHG

## 2025-03-19 DIAGNOSIS — Z12.5 PROSTATE CANCER SCREENING: ICD-10-CM

## 2025-03-19 DIAGNOSIS — R53.83 OTHER FATIGUE: ICD-10-CM

## 2025-03-19 DIAGNOSIS — R73.9 ELEVATED BLOOD SUGAR LEVEL: ICD-10-CM

## 2025-03-19 DIAGNOSIS — E78.2 MIXED HYPERLIPIDEMIA: ICD-10-CM

## 2025-03-19 DIAGNOSIS — Z87.891 PERSONAL HISTORY OF NICOTINE DEPENDENCE: ICD-10-CM

## 2025-03-19 DIAGNOSIS — F43.23 ADJUSTMENT DISORDER WITH MIXED ANXIETY AND DEPRESSED MOOD: ICD-10-CM

## 2025-03-19 DIAGNOSIS — E11.65 TYPE 2 DIABETES MELLITUS WITH HYPERGLYCEMIA, WITHOUT LONG-TERM CURRENT USE OF INSULIN: ICD-10-CM

## 2025-03-19 DIAGNOSIS — E11.9 TYPE 2 DIABETES MELLITUS WITHOUT COMPLICATION, WITHOUT LONG-TERM CURRENT USE OF INSULIN (MULTI): ICD-10-CM

## 2025-03-19 DIAGNOSIS — I47.10 SVT (SUPRAVENTRICULAR TACHYCARDIA) (CMS-HCC): ICD-10-CM

## 2025-03-19 DIAGNOSIS — Z00.00 WELLNESS EXAMINATION: Primary | ICD-10-CM

## 2025-03-19 DIAGNOSIS — E55.9 VITAMIN D DEFICIENCY: ICD-10-CM

## 2025-03-19 DIAGNOSIS — E78.5 HYPERLIPIDEMIA, UNSPECIFIED HYPERLIPIDEMIA TYPE: ICD-10-CM

## 2025-03-19 DIAGNOSIS — K21.9 GASTROESOPHAGEAL REFLUX DISEASE WITHOUT ESOPHAGITIS: ICD-10-CM

## 2025-03-19 LAB
25(OH)D3+25(OH)D2 SERPL-MCNC: 36 NG/ML (ref 30–100)
ALBUMIN SERPL-MCNC: 4.2 G/DL (ref 3.6–5.1)
ALP SERPL-CCNC: 95 U/L (ref 35–144)
ALT SERPL-CCNC: 19 U/L (ref 9–46)
ANION GAP SERPL CALCULATED.4IONS-SCNC: 10 MMOL/L (CALC) (ref 7–17)
AST SERPL-CCNC: 14 U/L (ref 10–35)
BASOPHILS # BLD AUTO: 37 CELLS/UL (ref 0–200)
BASOPHILS NFR BLD AUTO: 0.4 %
BILIRUB SERPL-MCNC: 0.7 MG/DL (ref 0.2–1.2)
BUN SERPL-MCNC: 13 MG/DL (ref 7–25)
CALCIUM SERPL-MCNC: 8.9 MG/DL (ref 8.6–10.3)
CHLORIDE SERPL-SCNC: 105 MMOL/L (ref 98–110)
CHOLEST SERPL-MCNC: 104 MG/DL
CHOLEST/HDLC SERPL: 4 (CALC)
CO2 SERPL-SCNC: 25 MMOL/L (ref 20–32)
CREAT SERPL-MCNC: 0.89 MG/DL (ref 0.7–1.3)
EGFRCR SERPLBLD CKD-EPI 2021: 100 ML/MIN/1.73M2
EOSINOPHIL # BLD AUTO: 166 CELLS/UL (ref 15–500)
EOSINOPHIL NFR BLD AUTO: 1.8 %
ERYTHROCYTE [DISTWIDTH] IN BLOOD BY AUTOMATED COUNT: 13 % (ref 11–15)
EST. AVERAGE GLUCOSE BLD GHB EST-MCNC: 232 MG/DL
EST. AVERAGE GLUCOSE BLD GHB EST-SCNC: 12.8 MMOL/L
GLUCOSE SERPL-MCNC: 172 MG/DL (ref 65–99)
HBA1C MFR BLD: 9.7 % OF TOTAL HGB
HCT VFR BLD AUTO: 53.8 % (ref 38.5–50)
HDLC SERPL-MCNC: 26 MG/DL
HGB BLD-MCNC: 18 G/DL (ref 13.2–17.1)
LDLC SERPL CALC-MCNC: 48 MG/DL (CALC)
LYMPHOCYTES # BLD AUTO: 2475 CELLS/UL (ref 850–3900)
LYMPHOCYTES NFR BLD AUTO: 26.9 %
MCH RBC QN AUTO: 32.4 PG (ref 27–33)
MCHC RBC AUTO-ENTMCNC: 33.5 G/DL (ref 32–36)
MCV RBC AUTO: 96.9 FL (ref 80–100)
MONOCYTES # BLD AUTO: 773 CELLS/UL (ref 200–950)
MONOCYTES NFR BLD AUTO: 8.4 %
NEUTROPHILS # BLD AUTO: 5750 CELLS/UL (ref 1500–7800)
NEUTROPHILS NFR BLD AUTO: 62.5 %
NONHDLC SERPL-MCNC: 78 MG/DL (CALC)
PLATELET # BLD AUTO: 156 THOUSAND/UL (ref 140–400)
PMV BLD REES-ECKER: 11 FL (ref 7.5–12.5)
POTASSIUM SERPL-SCNC: 4.4 MMOL/L (ref 3.5–5.3)
PROT SERPL-MCNC: 7 G/DL (ref 6.1–8.1)
RBC # BLD AUTO: 5.55 MILLION/UL (ref 4.2–5.8)
SODIUM SERPL-SCNC: 140 MMOL/L (ref 135–146)
TRIGL SERPL-MCNC: 243 MG/DL
TSH SERPL-ACNC: 1.17 MIU/L (ref 0.4–4.5)
WBC # BLD AUTO: 9.2 THOUSAND/UL (ref 3.8–10.8)

## 2025-03-19 PROCEDURE — 3078F DIAST BP <80 MM HG: CPT | Performed by: FAMILY MEDICINE

## 2025-03-19 PROCEDURE — 99214 OFFICE O/P EST MOD 30 MIN: CPT | Performed by: FAMILY MEDICINE

## 2025-03-19 PROCEDURE — 3008F BODY MASS INDEX DOCD: CPT | Performed by: FAMILY MEDICINE

## 2025-03-19 PROCEDURE — 3074F SYST BP LT 130 MM HG: CPT | Performed by: FAMILY MEDICINE

## 2025-03-19 PROCEDURE — 99396 PREV VISIT EST AGE 40-64: CPT | Performed by: FAMILY MEDICINE

## 2025-03-19 RX ORDER — BUPROPION HYDROCHLORIDE 150 MG/1
150 TABLET ORAL EVERY MORNING
Qty: 30 TABLET | Refills: 1 | Status: SHIPPED | OUTPATIENT
Start: 2025-03-19 | End: 2025-03-19

## 2025-03-19 RX ORDER — BUPROPION HYDROCHLORIDE 150 MG/1
150 TABLET ORAL EVERY MORNING
Qty: 90 TABLET | Refills: 3 | Status: SHIPPED | OUTPATIENT
Start: 2025-03-19 | End: 2026-03-19

## 2025-03-19 ASSESSMENT — PROMIS GLOBAL HEALTH SCALE
CARRYOUT_SOCIAL_ACTIVITIES: GOOD
RATE_QUALITY_OF_LIFE: GOOD
RATE_AVERAGE_FATIGUE: MODERATE
RATE_MENTAL_HEALTH: FAIR
RATE_SOCIAL_SATISFACTION: FAIR
RATE_PHYSICAL_HEALTH: FAIR
RATE_GENERAL_HEALTH: FAIR
CARRYOUT_PHYSICAL_ACTIVITIES: MOSTLY
RATE_AVERAGE_PAIN: 4
EMOTIONAL_PROBLEMS: OFTEN

## 2025-03-19 NOTE — PATIENT INSTRUCTIONS
1.  Well visit    Today in the office you had your annual wellness exam    You are up-to-date with your colonoscopy for colon cancer screening    Will continue to check PSAs annually for prostate cancer screening    Recent labs do indicate hemoglobin A1c is elevated.  This is probably multifactorial including stress exercising less eating habits.  You are currently taking metformin and glimepiride and Farxiga.  I am concerned that with elevated blood sugars you are causing increased inflammation to the arteries which can increase your risk for stroke blindness heart attack kidney failure amputated toe    I am recommending you continue on all current medications metformin glimepiride and Farxiga and add Ozempic injectable starting at 0.25 mg once a week I will place a referral for you to talk with our pharmacist Leelee potential side effects on the Ozempic could be some nausea or bloating    Will repeat your labs in 6 months at that time we will also do the urine test    Continue on the current doses of Xarelto to prevent blood clot.  Continue on the metoprolol to help keep blood pressure down as well as control rapid heartbeat.  And continue on the atorvastatin which lowers cholesterol and reduces risk for heart attack and stroke    I am recommending you start on low-dose Wellbutrin 150 mg once a day.  This medication should help reduce stress and irritability.  Try to have a healthy relationship with family and friends.  Continue getting outside for at least 30 minutes and natural sunlight every day and continue with daily prayer and meditation if you would like to speak with a counselor let us know    Continue seeing your eye specialist being checked for diabetic retinopathy    If you have any skin issues or breakdown of the skin on your feet let us know we can have you meet with podiatry    For your smoking cessation the Wellbutrin should help we will also order a CT scan of the lung for lung cancer screening  with your history of smoking    I will see you back in 6 months please have repeat labs prior to that appointment as well I am happy to see you sooner if needed

## 2025-03-19 NOTE — PROGRESS NOTES
Subjective   Patient ID: Madhu Shah is a 59 y.o. male who presents for Annual Exam (CPE).      HPI   Wellness Exam  Hyperlipidemia: The patient is presenting today for a follow up of hyperlipidemia. The patient recent blood work shows elevated of lipid labs (please see attached labs in the note). The patient is  trying to eat a heart healthy diet keeping in mind to eat healthy fats and to avoid fried foods, fatty foods that may elevate their levels. The patient has not been hospitalized for this in the last 6 months. Current medications used are atorvastatin. The patient is compliant with medications. Patient denies any side effects to the medications.     Diabetes Mellitus: The patient presents today for a follow up of DM. The patient reports that they are trying to manage sugar levels at home. They are aware of when their sugar levels are high or low and how to correct as needed. They do not experience neuropathy. They do not see podiatry and endocrinology to follow their condition. The patient has not been hospitalized for this in the last 6 months.  The patient is currently taking glimepiride (Amaryl), metformin (Glucophage, Riomet), and farxiga . Patient denies any side effects to the medications. Home blood sugar records:  elevated . The patient is compliant with medications. The patient is not on insulin.    GERD: The patient presents today with symptoms of GERD. Pt relates that symptoms of dyspepsia and acid reflux have been causing discomfort and is persistent. Symptoms usually appear after a meal, at night when lying down or intermittently throughout the day. The patient has had no no previous hx of abdominal pain.  The patient reports No  change in BM.  Patient denies recent trauma.     The patient is trying to stay active and healthy. They are not currently exercising and remaining physically active. The aare maintaining a heathy diet that includes green leafy vegetables, fruits and proteins. They  "arestaying well hydrated.    The patient denies any changes in vision, hearing or dental.     The patient maintains they do not have any chest pain, chest tightness or shortness of breath.    They do not experience nausea, emesis, changes in bowel movements or dyspepsia.    The patient reports changes in or worsening of moods. Increase stress from his elderly Father who is causing significant issues,      The patient denies nocturia. They report urinating no times a night. The nocturia does not cause sleep disturbances.     The patient denies any issues with erections.    The patient's colonoscopy is up to date.     The patient's vaccinations are up to date.    Review of Systems   Constitutional: Negative.    HENT: Negative.  Negative for dental problem and hearing loss.    Eyes: Negative.  Negative for visual disturbance.   Respiratory: Negative.  Negative for chest tightness and shortness of breath.    Cardiovascular: Negative.  Negative for chest pain.   Gastrointestinal: Negative.  Negative for constipation, diarrhea, nausea and vomiting.   Endocrine: Negative.    Genitourinary: Negative.    Musculoskeletal: Negative.    Skin: Negative.    Allergic/Immunologic: Negative.    Neurological: Negative.    Hematological: Negative.    Psychiatric/Behavioral: Negative.     14/14 systems reviewed and negative other than what is listed in the history of present illness     Objective   /71 (BP Location: Left arm, Patient Position: Sitting, BP Cuff Size: Large adult)   Pulse 64   Temp 36.4 °C (97.5 °F) (Temporal)   Ht 1.784 m (5' 10.25\")   Wt 95.8 kg (211 lb 4.8 oz)   SpO2 97%   BMI 30.10 kg/m²     Physical Exam    Physical Exam  Constitutional:       Appearance: Normal appearance.   HENT:      Head: Normocephalic and atraumatic.      Nose: Nose normal.   Eyes:      Extraocular Movements: Extraocular movements intact.      Conjunctiva/sclera: Conjunctivae normal.      Pupils: Pupils are equal, round, and " reactive to light.   Cardiovascular:      Rate and Rhythm: Normal rate and regular rhythm.      Pulses: Normal pulses.      Heart sounds: Normal heart sounds.   Pulmonary:      Effort: Pulmonary effort is normal.      Breath sounds: Normal breath sounds and air entry.   Abdominal:      General: Bowel sounds are normal.      Palpations: Abdomen is soft.   Musculoskeletal:         General: Normal range of motion.      Cervical back: Normal range of motion.   Neurological:      Mental Status: He is alert.   Psychiatric:         Mood and Affect: Mood normal.         Behavior: Behavior normal.         Thought Content: Thought content normal.         Judgment: Judgment normal.           Assessment/Plan     1. Wellness examination        2. Type 2 diabetes mellitus without complication, without long-term current use of insulin (Multi)  Hemoglobin A1C    TSH with reflex to Free T4 if abnormal    Comprehensive Metabolic Panel    CBC and Auto Differential    Albumin-Creatinine Ratio, Urine Random    Hemoglobin A1C    TSH with reflex to Free T4 if abnormal    Comprehensive Metabolic Panel    CBC and Auto Differential    Albumin-Creatinine Ratio, Urine Random    semaglutide 0.25 mg or 0.5 mg (2 mg/3 mL) pen injector    DISCONTINUED: semaglutide 0.25 mg or 0.5 mg (2 mg/3 mL) pen injector      3. Mixed hyperlipidemia        4. Gastroesophageal reflux disease without esophagitis        5. Vitamin D deficiency  Vitamin D 25-Hydroxy,Total (for eval of Vitamin D levels)    Vitamin D 25-Hydroxy,Total (for eval of Vitamin D levels)      6. Elevated blood sugar level        7. Other fatigue        8. Hyperlipidemia, unspecified hyperlipidemia type  Lipid Panel    Comprehensive Metabolic Panel    Lipid Panel    Comprehensive Metabolic Panel      9. Personal history of nicotine dependence  CT lung screening low dose      10. Adjustment disorder with mixed anxiety and depressed mood  buPROPion XL (Wellbutrin XL) 150 mg 24 hr tablet     DISCONTINUED: buPROPion XL (Wellbutrin XL) 150 mg 24 hr tablet      11. SVT (supraventricular tachycardia) (CMS-HCC)        12. Type 2 diabetes mellitus with hyperglycemia, without long-term current use of insulin  Referral to Clinical Pharmacy      13. Prostate cancer screening  Prostate Specific Antigen, Screen    Prostate Specific Antigen, Screen        1.  Well visit    Today in the office you had your annual wellness exam    You are up-to-date with your colonoscopy for colon cancer screening    Will continue to check PSAs annually for prostate cancer screening    Recent labs do indicate hemoglobin A1c is elevated.  This is probably multifactorial including stress exercising less eating habits.  You are currently taking metformin and glimepiride and Farxiga.  I am concerned that with elevated blood sugars you are causing increased inflammation to the arteries which can increase your risk for stroke blindness heart attack kidney failure amputated toe    I am recommending you continue on all current medications metformin glimepiride and Farxiga and add Ozempic injectable starting at 0.25 mg once a week I will place a referral for you to talk with our pharmacist Leelee potential side effects on the Ozempic could be some nausea or bloating    Will repeat your labs in 6 months at that time we will also do the urine test    Continue on the current doses of Xarelto to prevent blood clot.  Continue on the metoprolol to help keep blood pressure down as well as control rapid heartbeat.  And continue on the atorvastatin which lowers cholesterol and reduces risk for heart attack and stroke    I am recommending you start on low-dose Wellbutrin 150 mg once a day.  This medication should help reduce stress and irritability.  Try to have a healthy relationship with family and friends.  Continue getting outside for at least 30 minutes and natural sunlight every day and continue with daily prayer and meditation if you would  like to speak with a counselor let us know    Continue seeing your eye specialist being checked for diabetic retinopathy    If you have any skin issues or breakdown of the skin on your feet let us know we can have you meet with podiatry    For your smoking cessation the Wellbutrin should help we will also order a CT scan of the lung for lung cancer screening with your history of smoking    I will see you back in 6 months please have repeat labs prior to that appointment as well I am happy to see you sooner if needed      Follow-up in 6 months or sooner if there are any concerns.    Scribe Attestation  By signing my name below, INancie Scribe   attest that this documentation has been prepared under the direction and in the presence of Cole Mix MD.    This note has been transcribed using a medical scribe and there is a possibility of unintentional typing misprints.

## 2025-03-27 ENCOUNTER — TELEPHONE (OUTPATIENT)
Dept: RADIOLOGY | Facility: HOSPITAL | Age: 60
End: 2025-03-27
Payer: COMMERCIAL

## 2025-03-27 NOTE — TELEPHONE ENCOUNTER
Returned the patient call and per the patient he stated that his insurance stated that they never got any request for the CT scan. Patient going to reschedule on my chart.

## 2025-03-28 ENCOUNTER — APPOINTMENT (OUTPATIENT)
Dept: PRIMARY CARE | Facility: CLINIC | Age: 60
End: 2025-03-28
Payer: COMMERCIAL

## 2025-04-01 ENCOUNTER — APPOINTMENT (OUTPATIENT)
Dept: RADIOLOGY | Facility: CLINIC | Age: 60
End: 2025-04-01
Payer: COMMERCIAL

## 2025-04-07 ENCOUNTER — APPOINTMENT (OUTPATIENT)
Dept: PHARMACY | Facility: HOSPITAL | Age: 60
End: 2025-04-07
Payer: COMMERCIAL

## 2025-04-11 ENCOUNTER — HOSPITAL ENCOUNTER (OUTPATIENT)
Dept: RADIOLOGY | Facility: CLINIC | Age: 60
Discharge: HOME | End: 2025-04-11
Payer: COMMERCIAL

## 2025-04-11 DIAGNOSIS — Z87.891 PERSONAL HISTORY OF NICOTINE DEPENDENCE: ICD-10-CM

## 2025-04-11 PROCEDURE — 71271 CT THORAX LUNG CANCER SCR C-: CPT

## 2025-04-14 DIAGNOSIS — E11.9 TYPE 2 DIABETES MELLITUS WITHOUT COMPLICATION, WITHOUT LONG-TERM CURRENT USE OF INSULIN: ICD-10-CM

## 2025-04-14 NOTE — TELEPHONE ENCOUNTER
Patient called in to the refill line saying he is ready to reduce smoking and asking for the Chantix starter pack to be sent to [facility] in [place].    Also needs refill on Ozempic.

## 2025-04-15 DIAGNOSIS — R91.1 PULMONARY NODULE: ICD-10-CM

## 2025-04-15 DIAGNOSIS — R91.8 ABNORMAL CT SCAN OF LUNG: Primary | ICD-10-CM

## 2025-04-15 DIAGNOSIS — R93.3 ABNORMAL CT SCAN, COLON: ICD-10-CM

## 2025-04-18 ENCOUNTER — APPOINTMENT (OUTPATIENT)
Dept: PHARMACY | Facility: HOSPITAL | Age: 60
End: 2025-04-18
Payer: COMMERCIAL

## 2025-04-18 NOTE — PROGRESS NOTES
Subjective   Patient ID: Madhu Shah is a 59 y.o. male who presents for No chief complaint on file..  History of Present Illness    Patient was seen for an issue of electrical feeling down the left arm in September of 2024, Dr. Mix ordered an xray but looks like that had not been done yet  Objective     There were no vitals taken for this visit.     Physical Exam     Results         Assessment & Plan      Miriam Diaz MD     This medical note was created with the assistance of artificial intelligence (AI) for documentation purposes. The content has been reviewed and confirmed by the healthcare provider for accuracy and completeness. Patient consented to the use of audio recording and use of AI during their visit.    "September of 2024, Dr. Mix ordered an xray but looks like that had not been done yet    Objective     /74 (BP Location: Right arm, Patient Position: Sitting, BP Cuff Size: Large adult)   Pulse 74   Temp 36.9 °C (98.4 °F) (Temporal)   Resp 14   Ht 1.784 m (5' 10.25\")   Wt 95.2 kg (209 lb 12.8 oz)   SpO2 93%   BMI 29.89 kg/m²      Physical Exam   Patient has limited ROM of the neck mostly in the lateral bend, rotation is normal and flexion extension is normal. He seems to maintain a forward curvature (loss of lordosis) he has full ROM of the left shoulder, some muscular tightness that is palpable in the left upper back in the top of the trapezius, no trigger points.   Results         Assessment & Plan  Neck pain with numbness and muscle tightness  Acute neck pain with associated numbness and muscle tightness, likely related to cervical spine issues. Previous C4 fusion surgery was successful. Symptoms suggest possible disc or nerve involvement, with differential including disc pathology or nerve impingement. Discussed potential benefits and drawbacks of muscle relaxants and steroids. Muscle relaxants may aid in sleep and reduce muscle tightness but can cause drowsiness. Short-term steroid use may provide significant pain relief but could elevate blood glucose levels. Physical therapy may be required by insurance prior to MRI approval if X-ray suggests disc pathology.  - Order cervical spine X-ray to assess for disc pathology or other structural issues.  - Prescribe muscle relaxants to aid in sleep and reduce muscle tightness.  - Discuss potential use of physical therapy, including dry needling and massage techniques, to address structural and postural issues.  - Consider MRI if X-ray suggests disc pathology, noting that physical therapy may be required by insurance prior to MRI approval.    Diabetes mellitus  Diabetes mellitus, well-controlled with current medication regimen. Discussed potential impact " of short-term steroid use on blood glucose levels. He is willing to accept temporary elevation in blood glucose for pain relief benefits.  - Prescribe a short course of prednisone for five days to manage neck pain and inflammation. Monitor blood glucose levels closely during this period.    Tobacco use  He is motivated to quit smoking, especially after the birth of his second grandchild. Previous use of Chantix without side effects. He feels more prepared to quit at this time.  - Prescribe Chantix starter pack to be [facility] at [facility].    Miriam Diaz MD     This medical note was created with the assistance of artificial intelligence (AI) for documentation purposes. The content has been reviewed and confirmed by the healthcare provider for accuracy and completeness. Patient consented to the use of audio recording and use of AI during their visit.

## 2025-04-21 ENCOUNTER — APPOINTMENT (OUTPATIENT)
Dept: PRIMARY CARE | Facility: CLINIC | Age: 60
End: 2025-04-21
Payer: COMMERCIAL

## 2025-04-21 VITALS
WEIGHT: 209.8 LBS | DIASTOLIC BLOOD PRESSURE: 74 MMHG | HEART RATE: 74 BPM | SYSTOLIC BLOOD PRESSURE: 124 MMHG | TEMPERATURE: 98.4 F | BODY MASS INDEX: 30.03 KG/M2 | HEIGHT: 70 IN | RESPIRATION RATE: 14 BRPM | OXYGEN SATURATION: 93 %

## 2025-04-21 DIAGNOSIS — F17.200 TOBACCO DEPENDENCE: ICD-10-CM

## 2025-04-21 DIAGNOSIS — M54.9 UPPER BACK PAIN ON LEFT SIDE: Primary | ICD-10-CM

## 2025-04-21 DIAGNOSIS — M54.2 NECK PAIN: ICD-10-CM

## 2025-04-21 PROCEDURE — 3008F BODY MASS INDEX DOCD: CPT | Performed by: FAMILY MEDICINE

## 2025-04-21 PROCEDURE — 99214 OFFICE O/P EST MOD 30 MIN: CPT | Performed by: FAMILY MEDICINE

## 2025-04-21 PROCEDURE — 3074F SYST BP LT 130 MM HG: CPT | Performed by: FAMILY MEDICINE

## 2025-04-21 PROCEDURE — 3078F DIAST BP <80 MM HG: CPT | Performed by: FAMILY MEDICINE

## 2025-04-21 RX ORDER — PREDNISONE 20 MG/1
40 TABLET ORAL DAILY
Qty: 10 TABLET | Refills: 0 | Status: SHIPPED | OUTPATIENT
Start: 2025-04-21 | End: 2025-04-26

## 2025-04-21 RX ORDER — VARENICLINE TARTRATE 1 MG/1
1 TABLET, FILM COATED ORAL 2 TIMES DAILY
Qty: 90 TABLET | Refills: 1 | Status: SHIPPED | OUTPATIENT
Start: 2025-04-21 | End: 2025-07-20

## 2025-04-21 RX ORDER — VARENICLINE TARTRATE 0.5 (11)-1
KIT ORAL
Qty: 53 EACH | Refills: 0 | Status: SHIPPED | OUTPATIENT
Start: 2025-04-21

## 2025-04-21 RX ORDER — CYCLOBENZAPRINE HCL 10 MG
10 TABLET ORAL 3 TIMES DAILY PRN
Qty: 30 TABLET | Refills: 0 | Status: SHIPPED | OUTPATIENT
Start: 2025-04-21 | End: 2025-06-20

## 2025-04-22 ENCOUNTER — HOSPITAL ENCOUNTER (OUTPATIENT)
Dept: RADIOLOGY | Facility: CLINIC | Age: 60
Discharge: HOME | End: 2025-04-22
Payer: COMMERCIAL

## 2025-04-22 DIAGNOSIS — M54.9 UPPER BACK PAIN ON LEFT SIDE: ICD-10-CM

## 2025-04-22 DIAGNOSIS — M54.2 NECK PAIN: ICD-10-CM

## 2025-04-22 PROCEDURE — 72050 X-RAY EXAM NECK SPINE 4/5VWS: CPT | Performed by: RADIOLOGY

## 2025-04-22 PROCEDURE — 72050 X-RAY EXAM NECK SPINE 4/5VWS: CPT

## 2025-04-24 NOTE — PROGRESS NOTES
Clinical Pharmacy Appointment    Patient ID: Madhu Shah is a 60 y.o. male who presents for Diabetes.    Pt is here for First appointment.     Referring Provider: Cole Mix MD  PCP: Cole Mix MD  Last visit with PCP: 3/19/2025   Next visit with PCP: 9/23/2025    Subjective     Drug Interactions  No relevant drug interactions were noted.    Medication System Management  Patient's preferred pharmacy: [facility] in [place]   Adherence/Organization: None  Affordability/Accessibility: None     HPI  DIABETES MELLITUS TYPE 2:    Does patient follow with Endocrinology: No  Last optometry exam: need to schedule   Podiatry:  patient denies sores or cuts on feet today      Current diabetic medications include:  Glimepiride 4 mg once daily   Metformin  mg 2 tablets twice daily    Farxiga 10 mg once daily   Ozempic 0.5 mg once weekly injection on Mondays      Clarifications to above regimen: None  Adverse Effects: None     Past diabetic medications include:  N/A    Glucose Readings:  Glucometer/CGM Type: patient to obtain glucometer through work program   Patient tests BG 0 times per day    Current home BG readings (mg/dL): N/A   Previous home BG readings (mg/dL): N/A    Any episodes of hypoglycemia? No,  .  Did patient treat episode of hypoglycemia appropriately? N/A  Does the patient have a prescription for ready-to-use Glucagon? Not on insulin    Lifestyle:  Diet: 3 meals/day.   Not eating fast food   Has been eating too many carbohydrates and some sweets but is trying to cut back  Drinks: Drinking soda but trying to switch to water with flavor packets  Exercise: exercises infrequently   Activity type: Type of Exercise : strength training, walking, and golfing  Is limited by: Neck injury currently     Secondary Prevention:  Statin? Yes  ACE-I/ARB? No  Aspirin? No    Pertinent PMH Review:  PMH of Pancreatitis: No  PMH of Retinopathy: No  PMH of Urinary Tract Infections: No  PMH of MTC: No  PMH of  MEN2: No  UACR/EGFR in last year?: No  Albumin/Creatinine Ratio   Date Value Ref Range Status   02/22/2024   Final     Comment:     One or more analytes used in this calculation is outside of the analytical measurement range. Calculation cannot be performed.     Albumin/Creatine Ratio   Date Value Ref Range Status   03/07/2023 SEE COMMENT 0.0 - 30.0 ug/mg crt Final     Comment:     One or more analytes used in this calculation   is outside of the analytical measurement range.  Calculation cannot be performed.     02/10/2022 9.3 0.0 - 30.0 ug/mg crt Final       Objective   Allergies[1]  Social History     Social History Narrative    Not on file      Medication Review  Current Outpatient Medications   Medication Instructions    atorvastatin (LIPITOR) 40 mg, oral, Daily    buPROPion XL (WELLBUTRIN XL) 150 mg, oral, Every morning, Do not crush, chew, or split.    cyanocobalamin, vitamin B-12, 1,000 mcg tablet, sublingual Place under the tongue. take one tablet and allow to dissolve in your mouth once a day    cyclobenzaprine (FLEXERIL) 10 mg, oral, 3 times daily PRN    dapagliflozin propanediol (FARXIGA) 10 mg, oral, Daily    glimepiride (AMARYL) 4 mg, oral, Daily    metFORMIN XR (GLUCOPHAGE-XR) 2,000 mg, oral, Daily    metoprolol tartrate (LOPRESSOR) 50 mg, oral, Every 12 hours    morphine CR (MS CONTIN) 30 mg, Daily    rivaroxaban (XARELTO) 20 mg, oral, Daily    semaglutide 0.5 mg, subcutaneous, Weekly    sildenafil (VIAGRA) 100 mg, oral, As needed, take 1 hour before needed    traZODone (DESYREL) 50 mg, oral, Nightly    varenicline tartrate (Chantix Starting Month Box) 0.5 mg (11)- 1 mg (42) tablet Take as directed    varenicline tartrate (CHANTIX) 1 mg, oral, 2 times daily, Take with full glass of water.      Vitals  BP Readings from Last 2 Encounters:   04/21/25 124/74   03/19/25 121/71     BMI Readings from Last 1 Encounters:   04/21/25 29.89 kg/m²      Labs  A1C  Lab Results   Component Value Date    HGBA1C 9.7  (H) 03/18/2025    HGBA1C 7.9 (H) 09/20/2024    HGBA1C 7.4 (H) 02/22/2024     BMP  Lab Results   Component Value Date    CALCIUM 8.9 03/18/2025     03/18/2025    K 4.4 03/18/2025    CO2 25 03/18/2025     03/18/2025    BUN 13 03/18/2025    CREATININE 0.89 03/18/2025    EGFR 100 03/18/2025     LFTs  Lab Results   Component Value Date    ALT 19 03/18/2025    AST 14 03/18/2025    ALKPHOS 95 03/18/2025    BILITOT 0.7 03/18/2025     FLP  Lab Results   Component Value Date    TRIG 243 (H) 03/18/2025    CHOL 104 03/18/2025    LDLF 46 08/19/2023    LDLCALC 48 03/18/2025    HDL 26 (L) 03/18/2025     Urine Microalbumin  Lab Results   Component Value Date    MICROALBCREA  02/22/2024      Comment:      One or more analytes used in this calculation is outside of the analytical measurement range. Calculation cannot be performed.     Weight Management  Wt Readings from Last 3 Encounters:   04/21/25 95.2 kg (209 lb 12.8 oz)   03/19/25 95.8 kg (211 lb 4.8 oz)   09/20/24 94.8 kg (209 lb)      There is no height or weight on file to calculate BMI.     Assessment/Plan   Problem List Items Addressed This Visit       Type 2 diabetes mellitus    Patient's goal A1c is < 7%.  Is pt at goal? No, A1C of 9.7%   Rationale for plan: Since the PCP visit the patient has started Ozempic and completed 4 weeks at the 0.25 mg dose. The patient reports no side effects to the Ozempic at this time.  Discussed increasing the dose and patient was agreeable.       Medication Changes:  CONTINUE  Glimepiride 4 mg once daily   Metformin  mg 2 tablets twice daily    Farxiga 10 mg once daily     INCREASE   Ozempic 0.5 mg once weekly injection on Mondays      Future Considerations:  Titrate Ozempic based on blood sugar control and tolerability     Monitoring and Education:  Counseled patient on relevant medication mechanisms of action, expectations, side effects, duration of therapy, contraindications, administration, and monitoring  parameters.  All questions and concerns addressed. Contact pharmacist with any further questions or concerns prior to next appointment.  Reviewed dietary recommendations: Healthy Plate method  Reviewed BG goals: Fasting BG goal , Postprandial BG goal <180 mg/dL, A1C goal <7%%         Relevant Medications    semaglutide 0.25 mg or 0.5 mg (2 mg/3 mL) pen injector    Other Relevant Orders    Referral to Clinical Pharmacy     Clinical Pharmacist follow-up: 3 weeks , Telehealth visit    Continue all meds under the continuation of care with the referring provider and clinical pharmacy team.    Thank you,  Leelee Lindsey, PharmD   Clinical Pharmacist  [phone]    Verbal consent to manage patient's drug therapy was obtained from the patient. They were informed they may decline to participate or withdraw from participation in pharmacy services at any time.         [1] No Known Allergies

## 2025-04-25 ENCOUNTER — APPOINTMENT (OUTPATIENT)
Dept: PHARMACY | Facility: HOSPITAL | Age: 60
End: 2025-04-25
Payer: COMMERCIAL

## 2025-04-25 ENCOUNTER — OFFICE (OUTPATIENT)
Dept: URBAN - METROPOLITAN AREA CLINIC 26 | Facility: CLINIC | Age: 60
End: 2025-04-25
Payer: COMMERCIAL

## 2025-04-25 VITALS
HEART RATE: 78 BPM | TEMPERATURE: 98.4 F | HEIGHT: 70 IN | SYSTOLIC BLOOD PRESSURE: 114 MMHG | WEIGHT: 207 LBS | DIASTOLIC BLOOD PRESSURE: 73 MMHG

## 2025-04-25 DIAGNOSIS — K21.9 GASTRO-ESOPHAGEAL REFLUX DISEASE WITHOUT ESOPHAGITIS: ICD-10-CM

## 2025-04-25 DIAGNOSIS — R19.4 CHANGE IN BOWEL HABIT: ICD-10-CM

## 2025-04-25 DIAGNOSIS — Z86.0101 PERSONAL HISTORY OF ADENOMATOUS AND SERRATED COLON POLYPS: ICD-10-CM

## 2025-04-25 DIAGNOSIS — Z80.0 FAMILY HISTORY OF MALIGNANT NEOPLASM OF DIGESTIVE ORGANS: ICD-10-CM

## 2025-04-25 DIAGNOSIS — R93.89 ABNORMAL FINDINGS ON DIAGNOSTIC IMAGING OF OTHER SPECIFIED B: ICD-10-CM

## 2025-04-25 DIAGNOSIS — E11.65 TYPE 2 DIABETES MELLITUS WITH HYPERGLYCEMIA, WITHOUT LONG-TERM CURRENT USE OF INSULIN: ICD-10-CM

## 2025-04-25 DIAGNOSIS — E11.9 TYPE 2 DIABETES MELLITUS WITHOUT COMPLICATION, WITHOUT LONG-TERM CURRENT USE OF INSULIN: ICD-10-CM

## 2025-04-25 DIAGNOSIS — R13.10 DYSPHAGIA, UNSPECIFIED: ICD-10-CM

## 2025-04-25 PROCEDURE — 99214 OFFICE O/P EST MOD 30 MIN: CPT | Performed by: NURSE PRACTITIONER

## 2025-04-25 RX ORDER — PROMETHAZINE HYDROCHLORIDE 25 MG/1
TABLET ORAL
Qty: 3 | Refills: 0 | Status: ACTIVE
Start: 2025-04-25

## 2025-04-25 RX ORDER — SODIUM PICOSULFATE, MAGNESIUM OXIDE, AND ANHYDROUS CITRIC ACID 12; 3.5; 1 G/175ML; G/175ML; MG/175ML
LIQUID ORAL
Qty: 350 | Refills: 0 | Status: ACTIVE
Start: 2025-04-25

## 2025-04-25 RX ORDER — OMEPRAZOLE 40 MG/1
CAPSULE, DELAYED RELEASE ORAL
Qty: 90 | Refills: 3 | Status: ACTIVE
Start: 2025-04-25

## 2025-05-05 NOTE — ASSESSMENT & PLAN NOTE
Patient's goal A1c is < 7%.  Is pt at goal? No, A1C of 9.7%   Rationale for plan: Since the PCP visit the patient has started Ozempic and completed 4 weeks at the 0.25 mg dose. The patient reports no side effects to the Ozempic at this time.  Discussed increasing the dose and patient was agreeable.       Medication Changes:  CONTINUE  Glimepiride 4 mg once daily   Metformin  mg 2 tablets twice daily    Farxiga 10 mg once daily     INCREASE   Ozempic 0.5 mg once weekly injection on Mondays      Future Considerations:  Titrate Ozempic based on blood sugar control and tolerability     Monitoring and Education:  Counseled patient on relevant medication mechanisms of action, expectations, side effects, duration of therapy, contraindications, administration, and monitoring parameters.  All questions and concerns addressed. Contact pharmacist with any further questions or concerns prior to next appointment.  Reviewed dietary recommendations: Healthy Plate method  Reviewed BG goals: Fasting BG goal , Postprandial BG goal <180 mg/dL, A1C goal <7%%

## 2025-05-07 DIAGNOSIS — E11.9 TYPE 2 DIABETES MELLITUS WITHOUT COMPLICATION, WITHOUT LONG-TERM CURRENT USE OF INSULIN: ICD-10-CM

## 2025-05-16 ENCOUNTER — APPOINTMENT (OUTPATIENT)
Dept: PHARMACY | Facility: HOSPITAL | Age: 60
End: 2025-05-16
Payer: COMMERCIAL

## 2025-05-25 VITALS
OXYGEN SATURATION: 92 % | SYSTOLIC BLOOD PRESSURE: 112 MMHG | RESPIRATION RATE: 12 BRPM | SYSTOLIC BLOOD PRESSURE: 124 MMHG | SYSTOLIC BLOOD PRESSURE: 106 MMHG | SYSTOLIC BLOOD PRESSURE: 109 MMHG | SYSTOLIC BLOOD PRESSURE: 111 MMHG | WEIGHT: 190 LBS | RESPIRATION RATE: 17 BRPM | TEMPERATURE: 97.2 F | OXYGEN SATURATION: 95 % | DIASTOLIC BLOOD PRESSURE: 68 MMHG | OXYGEN SATURATION: 93 % | OXYGEN SATURATION: 94 % | DIASTOLIC BLOOD PRESSURE: 71 MMHG | OXYGEN SATURATION: 91 % | RESPIRATION RATE: 15 BRPM | DIASTOLIC BLOOD PRESSURE: 73 MMHG | RESPIRATION RATE: 6 BRPM | RESPIRATION RATE: 19 BRPM | HEART RATE: 88 BPM | RESPIRATION RATE: 10 BRPM | HEART RATE: 87 BPM | RESPIRATION RATE: 22 BRPM | RESPIRATION RATE: 18 BRPM | SYSTOLIC BLOOD PRESSURE: 119 MMHG | RESPIRATION RATE: 16 BRPM | SYSTOLIC BLOOD PRESSURE: 131 MMHG | SYSTOLIC BLOOD PRESSURE: 113 MMHG | HEART RATE: 80 BPM | SYSTOLIC BLOOD PRESSURE: 107 MMHG | HEART RATE: 89 BPM | HEIGHT: 70 IN | SYSTOLIC BLOOD PRESSURE: 100 MMHG | HEART RATE: 79 BPM | DIASTOLIC BLOOD PRESSURE: 70 MMHG | DIASTOLIC BLOOD PRESSURE: 61 MMHG | OXYGEN SATURATION: 97 % | SYSTOLIC BLOOD PRESSURE: 115 MMHG | HEART RATE: 85 BPM | DIASTOLIC BLOOD PRESSURE: 75 MMHG | RESPIRATION RATE: 14 BRPM | HEART RATE: 77 BPM | DIASTOLIC BLOOD PRESSURE: 72 MMHG

## 2025-05-28 ENCOUNTER — AMBULATORY SURGICAL CENTER (OUTPATIENT)
Dept: URBAN - METROPOLITAN AREA SURGERY 12 | Facility: SURGERY | Age: 60
End: 2025-05-28
Payer: COMMERCIAL

## 2025-05-28 ENCOUNTER — OFFICE (OUTPATIENT)
Dept: URBAN - METROPOLITAN AREA PATHOLOGY 2 | Facility: PATHOLOGY | Age: 60
End: 2025-05-28
Payer: COMMERCIAL

## 2025-05-28 DIAGNOSIS — K22.89 OTHER SPECIFIED DISEASE OF ESOPHAGUS: ICD-10-CM

## 2025-05-28 DIAGNOSIS — K22.2 ESOPHAGEAL OBSTRUCTION: ICD-10-CM

## 2025-05-28 DIAGNOSIS — K21.9 GASTRO-ESOPHAGEAL REFLUX DISEASE WITHOUT ESOPHAGITIS: ICD-10-CM

## 2025-05-28 DIAGNOSIS — R93.89 ABNORMAL FINDINGS ON DIAGNOSTIC IMAGING OF OTHER SPECIFIED B: ICD-10-CM

## 2025-05-28 DIAGNOSIS — K44.9 DIAPHRAGMATIC HERNIA WITHOUT OBSTRUCTION OR GANGRENE: ICD-10-CM

## 2025-05-28 DIAGNOSIS — Z86.0101 PERSONAL HISTORY OF ADENOMATOUS AND SERRATED COLON POLYPS: ICD-10-CM

## 2025-05-28 DIAGNOSIS — K57.30 DIVERTICULOSIS OF LARGE INTESTINE WITHOUT PERFORATION OR ABS: ICD-10-CM

## 2025-05-28 DIAGNOSIS — K64.8 OTHER HEMORRHOIDS: ICD-10-CM

## 2025-05-28 DIAGNOSIS — R19.4 CHANGE IN BOWEL HABIT: ICD-10-CM

## 2025-05-28 DIAGNOSIS — R13.10 DYSPHAGIA, UNSPECIFIED: ICD-10-CM

## 2025-05-28 DIAGNOSIS — Z80.0 FAMILY HISTORY OF MALIGNANT NEOPLASM OF DIGESTIVE ORGANS: ICD-10-CM

## 2025-05-28 DIAGNOSIS — K31.89 OTHER DISEASES OF STOMACH AND DUODENUM: ICD-10-CM

## 2025-05-28 PROCEDURE — 45378 DIAGNOSTIC COLONOSCOPY: CPT | Performed by: INTERNAL MEDICINE

## 2025-05-28 PROCEDURE — 43450 DILATE ESOPHAGUS 1/MULT PASS: CPT | Performed by: INTERNAL MEDICINE

## 2025-05-28 PROCEDURE — 88313 SPECIAL STAINS GROUP 2: CPT | Performed by: PATHOLOGY

## 2025-05-28 PROCEDURE — 88305 TISSUE EXAM BY PATHOLOGIST: CPT | Performed by: PATHOLOGY

## 2025-05-28 PROCEDURE — 88342 IMHCHEM/IMCYTCHM 1ST ANTB: CPT | Performed by: PATHOLOGY

## 2025-05-28 PROCEDURE — 43239 EGD BIOPSY SINGLE/MULTIPLE: CPT | Performed by: INTERNAL MEDICINE

## 2025-07-02 ENCOUNTER — APPOINTMENT (OUTPATIENT)
Dept: PHARMACY | Facility: HOSPITAL | Age: 60
End: 2025-07-02
Payer: COMMERCIAL

## 2025-07-02 DIAGNOSIS — E11.9 TYPE 2 DIABETES MELLITUS WITHOUT COMPLICATION, WITHOUT LONG-TERM CURRENT USE OF INSULIN: Primary | ICD-10-CM

## 2025-07-02 NOTE — PROGRESS NOTES
Clinical Pharmacy Appointment    Patient ID: Madhu Shah is a 60 y.o. male who presents for Diabetes.    Pt is here for Follow Up appointment.     Referring Provider: No ref. provider found  PCP: Cole Mix MD  Last visit with PCP: 3/19/2025   Next visit with PCP: 9/23/2025    Subjective     Drug Interactions  No relevant drug interactions were noted.    Medication System Management  Patient's preferred pharmacy: [facility] in [place]   Adherence/Organization: None  Affordability/Accessibility: None     HPI  DIABETES MELLITUS TYPE 2:    Does patient follow with Endocrinology: No  Last optometry exam: need to schedule   Podiatry:  patient denies sores or cuts on feet today      Current diabetic medications include:  Glimepiride 4 mg once daily   Metformin  mg 2 tablets twice daily    Farxiga 10 mg once daily   Ozempic 0.5 mg once weekly injection on Mondays      Clarifications to above regimen: None  Adverse Effects: None     Past diabetic medications include:  N/A    Glucose Readings:  Glucometer/CGM Type: glucometer   Patient tests BG 1 times per day    Current home BG readings (mg/dL): 125-170  Previous home BG readings (mg/dL): N/A    Any episodes of hypoglycemia? No,  .  Did patient treat episode of hypoglycemia appropriately? N/A  Does the patient have a prescription for ready-to-use Glucagon? Not on insulin    Lifestyle:  Diet: 3 meals/day.   Not eating fast food   Has been eating too many carbohydrates and some sweets but is trying to cut back  Drinks: Drinking soda but trying to switch to water with flavor packets  Exercise: exercises infrequently   Activity type: Type of Exercise : strength training, walking, and golfing  Is limited by: Neck injury currently     Secondary Prevention:  Statin? Yes  ACE-I/ARB? No  Aspirin? No    Pertinent PMH Review:  PMH of Pancreatitis: No  PMH of Retinopathy: No  PMH of Urinary Tract Infections: No  PMH of MTC: No  PMH of MEN2: No  UACR/EGFR in last  year?: No  Albumin/Creatinine Ratio   Date Value Ref Range Status   02/22/2024   Final     Comment:     One or more analytes used in this calculation is outside of the analytical measurement range. Calculation cannot be performed.     Albumin/Creatine Ratio   Date Value Ref Range Status   03/07/2023 SEE COMMENT 0.0 - 30.0 ug/mg crt Final     Comment:     One or more analytes used in this calculation   is outside of the analytical measurement range.  Calculation cannot be performed.     02/10/2022 9.3 0.0 - 30.0 ug/mg crt Final       Objective   Allergies[1]  Social History     Social History Narrative    Not on file      Medication Review  Current Outpatient Medications   Medication Instructions    atorvastatin (LIPITOR) 40 mg, oral, Daily    buPROPion XL (WELLBUTRIN XL) 150 mg, oral, Every morning, Do not crush, chew, or split.    cyanocobalamin, vitamin B-12, 1,000 mcg tablet, sublingual Place under the tongue. take one tablet and allow to dissolve in your mouth once a day    cyclobenzaprine (FLEXERIL) 10 mg, oral, 3 times daily PRN    dapagliflozin propanediol (FARXIGA) 10 mg, oral, Daily    glimepiride (AMARYL) 4 mg, oral, Daily    metFORMIN XR (GLUCOPHAGE-XR) 2,000 mg, oral, Daily    metoprolol tartrate (LOPRESSOR) 50 mg, oral, Every 12 hours    morphine CR (MS CONTIN) 30 mg, Daily    rivaroxaban (XARELTO) 20 mg, oral, Daily    semaglutide 0.5 mg, subcutaneous, Weekly    sildenafil (VIAGRA) 100 mg, oral, As needed, take 1 hour before needed    traZODone (DESYREL) 50 mg, oral, Nightly    varenicline tartrate (Chantix Starting Month Box) 0.5 mg (11)- 1 mg (42) tablet Take as directed    varenicline tartrate (CHANTIX) 1 mg, oral, 2 times daily, Take with full glass of water.      Vitals  BP Readings from Last 2 Encounters:   04/21/25 124/74   03/19/25 121/71     BMI Readings from Last 1 Encounters:   04/21/25 29.89 kg/m²      Labs  A1C  Lab Results   Component Value Date    HGBA1C 9.7 (H) 03/18/2025    HGBA1C 7.9  (H) 09/20/2024    HGBA1C 7.4 (H) 02/22/2024     BMP  Lab Results   Component Value Date    CALCIUM 8.9 03/18/2025     03/18/2025    K 4.4 03/18/2025    CO2 25 03/18/2025     03/18/2025    BUN 13 03/18/2025    CREATININE 0.89 03/18/2025    EGFR 100 03/18/2025     LFTs  Lab Results   Component Value Date    ALT 19 03/18/2025    AST 14 03/18/2025    ALKPHOS 95 03/18/2025    BILITOT 0.7 03/18/2025     FLP  Lab Results   Component Value Date    TRIG 243 (H) 03/18/2025    CHOL 104 03/18/2025    LDLF 46 08/19/2023    LDLCALC 48 03/18/2025    HDL 26 (L) 03/18/2025     Urine Microalbumin  Lab Results   Component Value Date    MICROALBCREA  02/22/2024      Comment:      One or more analytes used in this calculation is outside of the analytical measurement range. Calculation cannot be performed.     Weight Management  Wt Readings from Last 3 Encounters:   04/21/25 95.2 kg (209 lb 12.8 oz)   03/19/25 95.8 kg (211 lb 4.8 oz)   09/20/24 94.8 kg (209 lb)      There is no height or weight on file to calculate BMI.     Assessment/Plan   Problem List Items Addressed This Visit       Type 2 diabetes mellitus - Primary    Patient's goal A1c is < 7%.  Is pt at goal? No, A1C of 9.7%   Rationale for plan: Since last visit The patient reports no side effects to the Ozempic at this time.  Patient reports a decrease in appetite and a weight loss of about 30 lbs.  At this time will continue current dose as patient is tolerating and blood sugar has significantly improved.      Medication Changes:  CONTINUE  Glimepiride 4 mg once daily   Metformin  mg 2 tablets twice daily    Farxiga 10 mg once daily   Ozempic 0.5 mg once weekly injection on Mondays      Future Considerations:  Titrate Ozempic based on blood sugar control and tolerability     Monitoring and Education:  Counseled patient on relevant medication mechanisms of action, expectations, side effects, duration of therapy, contraindications, administration, and  monitoring parameters.  All questions and concerns addressed. Contact pharmacist with any further questions or concerns prior to next appointment.  Reviewed dietary recommendations: Healthy Plate method  Reviewed BG goals: Fasting BG goal , Postprandial BG goal <180 mg/dL, A1C goal <7%%         Relevant Orders    Referral to Clinical Pharmacy    Referral to Clinical Pharmacy     Clinical Pharmacist follow-up: 6 weeks , Telehealth visit    Continue all meds under the continuation of care with the referring provider and clinical pharmacy team.    Thank you,  Leelee Lindsey, PharmD   Clinical Pharmacist  [phone]    Verbal consent to manage patient's drug therapy was obtained from the patient. They were informed they may decline to participate or withdraw from participation in pharmacy services at any time.         [1] No Known Allergies     0

## 2025-07-02 NOTE — ASSESSMENT & PLAN NOTE
Patient's goal A1c is < 7%.  Is pt at goal? No, A1C of 9.7%   Rationale for plan: Since last visit The patient reports no side effects to the Ozempic at this time.  Patient reports a decrease in appetite and a weight loss of about 30 lbs.  At this time will continue current dose as patient is tolerating and blood sugar has significantly improved.      Medication Changes:  CONTINUE  Glimepiride 4 mg once daily   Metformin  mg 2 tablets twice daily    Farxiga 10 mg once daily   Ozempic 0.5 mg once weekly injection on Mondays      Future Considerations:  Titrate Ozempic based on blood sugar control and tolerability     Monitoring and Education:  Counseled patient on relevant medication mechanisms of action, expectations, side effects, duration of therapy, contraindications, administration, and monitoring parameters.  All questions and concerns addressed. Contact pharmacist with any further questions or concerns prior to next appointment.  Reviewed dietary recommendations: Healthy Plate method  Reviewed BG goals: Fasting BG goal , Postprandial BG goal <180 mg/dL, A1C goal <7%%

## 2025-07-09 DIAGNOSIS — E11.9 TYPE 2 DIABETES MELLITUS WITHOUT COMPLICATION, WITHOUT LONG-TERM CURRENT USE OF INSULIN: ICD-10-CM

## 2025-07-09 RX ORDER — SEMAGLUTIDE 0.68 MG/ML
0.5 INJECTION, SOLUTION SUBCUTANEOUS
Qty: 9 ML | Refills: 3 | Status: SHIPPED | OUTPATIENT
Start: 2025-07-13

## 2025-07-11 DIAGNOSIS — E11.9 TYPE 2 DIABETES MELLITUS WITHOUT COMPLICATION, WITHOUT LONG-TERM CURRENT USE OF INSULIN: ICD-10-CM

## 2025-07-11 DIAGNOSIS — I49.9 CARDIAC ARRHYTHMIA, UNSPECIFIED CARDIAC ARRHYTHMIA TYPE: ICD-10-CM

## 2025-07-11 DIAGNOSIS — E78.2 MIXED HYPERLIPIDEMIA: ICD-10-CM

## 2025-07-11 DIAGNOSIS — I82.409 RECURRENT DEEP VEIN THROMBOSIS (DVT) OF LOWER EXTREMITY, UNSPECIFIED LATERALITY: ICD-10-CM

## 2025-07-11 DIAGNOSIS — F51.01 PRIMARY INSOMNIA: ICD-10-CM

## 2025-07-11 RX ORDER — METOPROLOL TARTRATE 50 MG/1
50 TABLET ORAL EVERY 12 HOURS
Qty: 180 TABLET | Refills: 3 | Status: SHIPPED | OUTPATIENT
Start: 2025-07-11

## 2025-07-11 RX ORDER — GLIMEPIRIDE 4 MG/1
4 TABLET ORAL DAILY
Qty: 90 TABLET | Refills: 3 | Status: SHIPPED | OUTPATIENT
Start: 2025-07-11

## 2025-07-11 RX ORDER — ATORVASTATIN CALCIUM 40 MG/1
40 TABLET, FILM COATED ORAL DAILY
Qty: 90 TABLET | Refills: 3 | Status: SHIPPED | OUTPATIENT
Start: 2025-07-11

## 2025-07-11 RX ORDER — RIVAROXABAN 20 MG/1
20 TABLET, FILM COATED ORAL DAILY
Qty: 90 TABLET | Refills: 3 | Status: SHIPPED | OUTPATIENT
Start: 2025-07-11

## 2025-07-11 RX ORDER — METFORMIN HYDROCHLORIDE 500 MG/1
2000 TABLET, EXTENDED RELEASE ORAL DAILY
Qty: 360 TABLET | Refills: 3 | Status: SHIPPED | OUTPATIENT
Start: 2025-07-11

## 2025-07-11 RX ORDER — TRAZODONE HYDROCHLORIDE 50 MG/1
50 TABLET ORAL NIGHTLY
Qty: 90 TABLET | Refills: 3 | Status: SHIPPED | OUTPATIENT
Start: 2025-07-11

## 2025-08-13 ENCOUNTER — APPOINTMENT (OUTPATIENT)
Dept: PHARMACY | Facility: HOSPITAL | Age: 60
End: 2025-08-13
Payer: COMMERCIAL

## 2025-09-10 ENCOUNTER — APPOINTMENT (OUTPATIENT)
Dept: PHARMACY | Facility: HOSPITAL | Age: 60
End: 2025-09-10
Payer: COMMERCIAL

## 2025-09-23 ENCOUNTER — APPOINTMENT (OUTPATIENT)
Dept: PRIMARY CARE | Facility: CLINIC | Age: 60
End: 2025-09-23
Payer: COMMERCIAL